# Patient Record
Sex: FEMALE | Race: WHITE | NOT HISPANIC OR LATINO | Employment: FULL TIME | URBAN - METROPOLITAN AREA
[De-identification: names, ages, dates, MRNs, and addresses within clinical notes are randomized per-mention and may not be internally consistent; named-entity substitution may affect disease eponyms.]

---

## 2020-01-21 ENCOUNTER — TRANSFERRED RECORDS (OUTPATIENT)
Dept: HEALTH INFORMATION MANAGEMENT | Facility: CLINIC | Age: 25
End: 2020-01-21

## 2020-01-21 LAB
PAP-ABSTRACT: NORMAL
TSH SERPL-ACNC: 0.94 UIU/ML (ref 0.35–4.8)

## 2020-10-19 ENCOUNTER — TRANSFERRED RECORDS (OUTPATIENT)
Dept: HEALTH INFORMATION MANAGEMENT | Facility: CLINIC | Age: 25
End: 2020-10-19

## 2020-12-30 NOTE — PROGRESS NOTES
"Subjective     Isis Sue is a 25 year old female who presents to clinic today for the following health issues:    HPI         Menstrual Concern - Prolonged bleeding during period, 1-2 heavy bleeding days  Onset/Duration:   Description:   Duration of bleeding episodes: 8-10 days  Frequency of periods: (1st day of one to 1st day of next):  every 26-29 days  Describe bleeding/flow:   Clots: YES- on heavy days  Number of pads/day: 5        Cramping: Really bad the day before the heavy   Accompanying Signs & Symptoms:  Lightheadedness: no  Temperature intolerance: no  Nosebleeds/Easy bruising: no  Vaginal Discharge: no  Acne: YES- Continual back acne - worse on certain days during her uriel  Change in body hair: no  History:  No LMP recorded. 1st day of last period was 12/22/2020  Previous normal periods: Unknown/didn't pay attention   Contraceptive use: NO  Sexually active: YES  Any bleeding after intercourse: no  Abnormal PAP Smears: no  Precipitating or alleviating factors: n/a  Therapies tried and outcome: None      Concern - Short post peak days  Onset: A year ago   Therapies tried and outcome:  none       Review of Systems   Constitutional, HEENT, cardiovascular, pulmonary, GI, , musculoskeletal, neuro, skin, endocrine and psych systems are negative, except as otherwise noted.      Objective    /82 (BP Location: Right arm, Patient Position: Sitting, Cuff Size: Adult Regular)   Pulse 78   Temp 98.9  F (37.2  C) (Tympanic)   Resp 16   Ht 1.6 m (5' 3\")   Wt 58.4 kg (128 lb 12.8 oz)   SpO2 100%   BMI 22.82 kg/m    Body mass index is 22.82 kg/m .  Physical Exam   GENERAL: healthy, alert and no distress  RESP: lungs clear to auscultation - no rales, rhonchi or wheezes  CV: tachycardia, S3 present, grade 2-3/6 diastolic murmur heard best over the LUSB and no peripheral edema  MS: no gross musculoskeletal defects noted, no edema  PSYCH: mentation appears normal, affect normal/bright    EKG - Reviewed and " interpreted by me appears normal, NSR, normal axis, normal intervals, no acute ST/T changes c/w ischemia, there are no prior tracings available, question Qt prolongation, some artifact, some flipped T waves, echo pending        Assessment & Plan     Metrorrhagia  Long bleeding pattern, few dry days post peak  Will do labs on Day 3 of menses next cycle as there is no definative peak +7 monthly  Follow-up 4-5 wks with video visit  - Multiple Vitamins-Minerals (OPTIVITE P.M.T.) TABS; Take 3 tablets by mouth 2 times daily  - US Pelvic Complete with Transvaginal; Future  - Lutropin; Future  - Follicle stimulating hormone; Future  - Testosterone Free and Total; Future  - DHEA sulfate; Future  - Androstenedione; Future  - Prolactin; Future  - TSH with free T4 reflex; Future  - Insulin level; Future  - 17 OH progesterone; Future  - Glucose; Future    Premenstrual syndrome  With strong fhx autoimmune and endometriosis as well, monitor  Start vitamins, follow-up 4-5 wks after ultrasound and labs  - Multiple Vitamins-Minerals (OPTIVITE P.M.T.) TABS; Take 3 tablets by mouth 2 times daily  - US Pelvic Complete with Transvaginal; Future  - Lutropin; Future  - Follicle stimulating hormone; Future  - Testosterone Free and Total; Future  - DHEA sulfate; Future  - Androstenedione; Future  - Prolactin; Future  - TSH with free T4 reflex; Future  - Insulin level; Future  - 17 OH progesterone; Future  - Glucose; Future    Heart murmur  New onset, not known to patient, could be sympathetic drive as she worked nights and hasn't slept yet and had to drive in fog this morning and had drank lots of caffeine    - Echocardiogram Complete; Future  - EKG 12-lead complete w/read - (Clinic Performed)    Prolonged Q-T interval on ECG  See above, may need cardiology consult pending echo results         45 min spent in chart review, reviewing records from [facility], discussion of her pathology and then work up for new heart murmur.    Patient was  agreeable to this plan and had no further questions.  There are no Patient Instructions on file for this visit.    No follow-ups on file.    Crissy Maguire MD  [facility]

## 2021-01-07 ENCOUNTER — OFFICE VISIT (OUTPATIENT)
Dept: FAMILY MEDICINE | Facility: OTHER | Age: 26
End: 2021-01-07
Attending: FAMILY MEDICINE
Payer: COMMERCIAL

## 2021-01-07 VITALS
HEART RATE: 78 BPM | HEIGHT: 63 IN | BODY MASS INDEX: 22.82 KG/M2 | OXYGEN SATURATION: 100 % | TEMPERATURE: 98.9 F | SYSTOLIC BLOOD PRESSURE: 110 MMHG | WEIGHT: 128.8 LBS | DIASTOLIC BLOOD PRESSURE: 82 MMHG | RESPIRATION RATE: 16 BRPM

## 2021-01-07 DIAGNOSIS — R01.1 HEART MURMUR: ICD-10-CM

## 2021-01-07 DIAGNOSIS — N92.1 METRORRHAGIA: Primary | ICD-10-CM

## 2021-01-07 DIAGNOSIS — R94.31 PROLONGED Q-T INTERVAL ON ECG: ICD-10-CM

## 2021-01-07 DIAGNOSIS — N94.3 PREMENSTRUAL SYNDROME: ICD-10-CM

## 2021-01-07 PROCEDURE — 93000 ELECTROCARDIOGRAM COMPLETE: CPT | Performed by: INTERNAL MEDICINE

## 2021-01-07 PROCEDURE — 99204 OFFICE O/P NEW MOD 45 MIN: CPT | Mod: 25 | Performed by: FAMILY MEDICINE

## 2021-01-07 RX ORDER — PRENATAL VIT/IRON FUM/FOLIC AC 27MG-0.8MG
1 TABLET ORAL DAILY
COMMUNITY

## 2021-01-07 RX ORDER — MULTIVITAMIN WITH MINERALS
3 TABLET ORAL 2 TIMES DAILY
Qty: 180 TABLET | Refills: 3 | Status: SHIPPED | OUTPATIENT
Start: 2021-01-07 | End: 2024-02-21

## 2021-01-07 SDOH — SOCIAL STABILITY: SOCIAL INSECURITY
WITHIN THE LAST YEAR, HAVE TO BEEN RAPED OR FORCED TO HAVE ANY KIND OF SEXUAL ACTIVITY BY YOUR PARTNER OR EX-PARTNER?: NO

## 2021-01-07 SDOH — HEALTH STABILITY: MENTAL HEALTH: HOW OFTEN DO YOU HAVE 6 OR MORE DRINKS ON ONE OCCASION?: NEVER

## 2021-01-07 SDOH — SOCIAL STABILITY: SOCIAL NETWORK: IN A TYPICAL WEEK, HOW MANY TIMES DO YOU TALK ON THE PHONE WITH FAMILY, FRIENDS, OR NEIGHBORS?: NOT ASKED

## 2021-01-07 SDOH — SOCIAL STABILITY: SOCIAL INSECURITY
WITHIN THE LAST YEAR, HAVE YOU BEEN KICKED, HIT, SLAPPED, OR OTHERWISE PHYSICALLY HURT BY YOUR PARTNER OR EX-PARTNER?: NO

## 2021-01-07 SDOH — ECONOMIC STABILITY: TRANSPORTATION INSECURITY
IN THE PAST 12 MONTHS, HAS LACK OF TRANSPORTATION KEPT YOU FROM MEETINGS, WORK, OR FROM GETTING THINGS NEEDED FOR DAILY LIVING?: NOT ASKED

## 2021-01-07 SDOH — SOCIAL STABILITY: SOCIAL INSECURITY: WITHIN THE LAST YEAR, HAVE YOU BEEN HUMILIATED OR EMOTIONALLY ABUSED IN OTHER WAYS BY YOUR PARTNER OR EX-PARTNER?: NO

## 2021-01-07 SDOH — SOCIAL STABILITY: SOCIAL NETWORK: ARE YOU MARRIED, WIDOWED, DIVORCED, SEPARATED, NEVER MARRIED, OR LIVING WITH A PARTNER?: NOT ASKED

## 2021-01-07 SDOH — SOCIAL STABILITY: SOCIAL NETWORK
DO YOU BELONG TO ANY CLUBS OR ORGANIZATIONS SUCH AS CHURCH GROUPS UNIONS, FRATERNAL OR ATHLETIC GROUPS, OR SCHOOL GROUPS?: NOT ASKED

## 2021-01-07 SDOH — HEALTH STABILITY: PHYSICAL HEALTH: ON AVERAGE, HOW MANY DAYS PER WEEK DO YOU ENGAGE IN MODERATE TO STRENUOUS EXERCISE (LIKE A BRISK WALK)?: 6 DAYS

## 2021-01-07 SDOH — HEALTH STABILITY: MENTAL HEALTH: HOW OFTEN DO YOU HAVE A DRINK CONTAINING ALCOHOL?: MONTHLY OR LESS

## 2021-01-07 SDOH — SOCIAL STABILITY: SOCIAL NETWORK: HOW OFTEN DO YOU GET TOGETHER WITH FRIENDS OR RELATIVES?: NOT ASKED

## 2021-01-07 SDOH — SOCIAL STABILITY: SOCIAL NETWORK: HOW OFTEN DO YOU ATTEND CHURCH OR RELIGIOUS SERVICES?: NOT ASKED

## 2021-01-07 SDOH — SOCIAL STABILITY: SOCIAL NETWORK: HOW OFTEN DO YOU ATTENT MEETINGS OF THE CLUB OR ORGANIZATION YOU BELONG TO?: NOT ASKED

## 2021-01-07 SDOH — HEALTH STABILITY: MENTAL HEALTH
STRESS IS WHEN SOMEONE FEELS TENSE, NERVOUS, ANXIOUS, OR CAN'T SLEEP AT NIGHT BECAUSE THEIR MIND IS TROUBLED. HOW STRESSED ARE YOU?: NOT ASKED

## 2021-01-07 SDOH — HEALTH STABILITY: PHYSICAL HEALTH: ON AVERAGE, HOW MANY MINUTES DO YOU ENGAGE IN EXERCISE AT THIS LEVEL?: 30 MIN

## 2021-01-07 SDOH — SOCIAL STABILITY: SOCIAL INSECURITY: WITHIN THE LAST YEAR, HAVE YOU BEEN AFRAID OF YOUR PARTNER OR EX-PARTNER?: NO

## 2021-01-07 SDOH — ECONOMIC STABILITY: TRANSPORTATION INSECURITY
IN THE PAST 12 MONTHS, HAS THE LACK OF TRANSPORTATION KEPT YOU FROM MEDICAL APPOINTMENTS OR FROM GETTING MEDICATIONS?: NOT ASKED

## 2021-01-07 SDOH — ECONOMIC STABILITY: FOOD INSECURITY: WITHIN THE PAST 12 MONTHS, YOU WORRIED THAT YOUR FOOD WOULD RUN OUT BEFORE YOU GOT MONEY TO BUY MORE.: NOT ASKED

## 2021-01-07 SDOH — HEALTH STABILITY: MENTAL HEALTH: HOW MANY STANDARD DRINKS CONTAINING ALCOHOL DO YOU HAVE ON A TYPICAL DAY?: 1 OR 2

## 2021-01-07 SDOH — ECONOMIC STABILITY: FOOD INSECURITY: WITHIN THE PAST 12 MONTHS, THE FOOD YOU BOUGHT JUST DIDN'T LAST AND YOU DIDN'T HAVE MONEY TO GET MORE.: NOT ASKED

## 2021-01-07 SDOH — ECONOMIC STABILITY: INCOME INSECURITY: HOW HARD IS IT FOR YOU TO PAY FOR THE VERY BASICS LIKE FOOD, HOUSING, MEDICAL CARE, AND HEATING?: NOT ASKED

## 2021-01-07 ASSESSMENT — PAIN SCALES - GENERAL: PAINLEVEL: NO PAIN (0)

## 2021-01-07 ASSESSMENT — MIFFLIN-ST. JEOR: SCORE: 1303.36

## 2021-01-15 ENCOUNTER — TRANSFERRED RECORDS (OUTPATIENT)
Dept: HEALTH INFORMATION MANAGEMENT | Facility: CLINIC | Age: 26
End: 2021-01-15

## 2021-01-18 LAB — TSH SERPL-ACNC: 2.76 MIU/ML (ref 0.35–4.8)

## 2021-02-12 ENCOUNTER — VIRTUAL VISIT (OUTPATIENT)
Dept: FAMILY MEDICINE | Facility: OTHER | Age: 26
End: 2021-02-12
Attending: FAMILY MEDICINE
Payer: COMMERCIAL

## 2021-02-12 DIAGNOSIS — E28.2 PCOS (POLYCYSTIC OVARIAN SYNDROME): ICD-10-CM

## 2021-02-12 DIAGNOSIS — N92.1 METRORRHAGIA: ICD-10-CM

## 2021-02-12 DIAGNOSIS — Q51.28 SEPTATE UTERUS: Primary | ICD-10-CM

## 2021-02-12 DIAGNOSIS — I38 DIASTOLIC MURMUR: ICD-10-CM

## 2021-02-12 PROCEDURE — 99215 OFFICE O/P EST HI 40 MIN: CPT | Mod: 95 | Performed by: FAMILY MEDICINE

## 2021-02-12 NOTE — NURSING NOTE
"Chief Complaint   Patient presents with     Recheck Medication       Initial There were no vitals taken for this visit. Estimated body mass index is 22.82 kg/m  as calculated from the following:    Height as of 1/7/21: 1.6 m (5' 3\").    Weight as of 1/7/21: 58.4 kg (128 lb 12.8 oz).  Medication Reconciliation: complete  Diogenes Mena LPN  "

## 2021-02-12 NOTE — PATIENT INSTRUCTIONS
1.  Contact [facility] echocardiogram department  2.  Will get cardiology consult  3.  Will re-fax orders for those other 4 labs -- do labs fasting on day 3 in the morning   4.  Keep on optivite 1 tab 2x/day  5.  Start smarty pants for women (pink lid) -- 3 gummies 2x/day  6.  septated uterus? -- ob/gyn referral Dr Walters  (need kidney ultrasound)  7.  After labs are done, start inositol or kary-inositol 1 'dose' 2x/day

## 2021-02-12 NOTE — PROGRESS NOTES
Isis is a 25 year old who is being evaluated via a billable video visit.      How would you like to obtain your AVS? MyChart  If the video visit is dropped, the invitation should be resent by: Text to cell phone: [phone]  Will anyone else be joining your video visit? No      Video Start Time: 1345    Assessment & Plan     Septate uterus    - OB/GYN REFERRAL    Diastolic murmur  Still needs echo  - CARDIOLOGY EVAL ADULT REFERRAL    PCOS (polycystic ovarian syndrome)  Reverse ratio but not all labs back, orders re-faxed and after she does labs on day 3, she can start inositol    Metrorrhagia  Improving slightly with vitamins      Review of prior external note(s) from - Outside records from [facility]  45 minutes spent on the date of the encounter doing chart review, history and exam, documentation and further activities as noted above       Patient was agreeable to this plan and had no further questions.  Patient Instructions   1.  Contact [facility] echocardiogram department  2.  Will get cardiology consult  3.  Will re-fax orders for those other 4 labs -- do labs fasting on day 3 in the morning   4.  Keep on optivite 1 tab 2x/day  5.  Start smarty pants for women (pink lid) -- 3 gummies 2x/day  6.  septated uterus? -- ob/gyn referral Dr Walters  (need kidney ultrasound)  7.  After labs are done, start inositol or kary-inositol 1 'dose' 2x/day      No follow-ups on file.    Crissy Maguire MD  [facility] - [place]    Subjective   Isis is a 25 year old who presents for the following health issues     HPI     Labs and ultrasound completed at [facility]    Follow up ultrasound/labs  Onset: 01/15/2021-ultrasound  Description: transvaginal ultrasound  Intensity: n/a  Progression of Symptoms:  n/a  Accompanying Signs & Symptoms: n/a  Previous history of similar problem: none    Review of Systems   Constitutional, HEENT, cardiovascular, pulmonary, GI, , musculoskeletal, neuro, skin, endocrine and psych  systems are negative, except as otherwise noted.      Objective           Vitals:  No vitals were obtained today due to virtual visit.    Physical Exam   GENERAL: Healthy, alert and no distress  EYES: Eyes grossly normal to inspection.  No discharge or erythema, or obvious scleral/conjunctival abnormalities.  RESP: No audible wheeze, cough, or visible cyanosis.  No visible retractions or increased work of breathing.    SKIN: Visible skin clear. No significant rash, abnormal pigmentation or lesions.  NEURO: Cranial nerves grossly intact.  Mentation and speech appropriate for age.  PSYCH: Mentation appears normal, affect normal/bright, judgement and insight intact, normal speech and appearance well-groomed.    No results found for any visits on 02/12/21.    I spent a total of 45 minutes face-to-face with Isis Hurd during today's office visit.  Over 50% of this time was spent counseling the patient and/or coordinating care regarding PCOS, diastolic murmur and septated uterus.  See note for details.        Video-Visit Details    Type of service:  Video Visit    Video End Time:1435    Originating Location (pt. Location): Home    Distant Location (provider location):  [facility]     Platform used for Video Visit: [employer]

## 2021-02-19 ENCOUNTER — TRANSFERRED RECORDS (OUTPATIENT)
Dept: HEALTH INFORMATION MANAGEMENT | Facility: CLINIC | Age: 26
End: 2021-02-19

## 2021-02-19 ENCOUNTER — TELEPHONE (OUTPATIENT)
Dept: FAMILY MEDICINE | Facility: OTHER | Age: 26
End: 2021-02-19

## 2021-02-19 LAB — GLUCOSE SERPL-MCNC: 91 MG/DL (ref 60–99)

## 2021-02-19 NOTE — TELEPHONE ENCOUNTER
Odalys from [facility] lab called wondering if she could draw labs for this patient.  Advised that the labs have been ordered on 1/7 and are all future lab orders.

## 2021-03-06 ENCOUNTER — HEALTH MAINTENANCE LETTER (OUTPATIENT)
Age: 26
End: 2021-03-06

## 2021-03-10 ENCOUNTER — TRANSFERRED RECORDS (OUTPATIENT)
Dept: HEALTH INFORMATION MANAGEMENT | Facility: CLINIC | Age: 26
End: 2021-03-10

## 2021-03-10 LAB — EJECTION FRACTION: =>55 %

## 2021-03-12 ENCOUNTER — TRANSFERRED RECORDS (OUTPATIENT)
Dept: HEALTH INFORMATION MANAGEMENT | Facility: CLINIC | Age: 26
End: 2021-03-12

## 2021-03-17 ENCOUNTER — TRANSFERRED RECORDS (OUTPATIENT)
Dept: HEALTH INFORMATION MANAGEMENT | Facility: CLINIC | Age: 26
End: 2021-03-17

## 2021-03-19 NOTE — PROGRESS NOTES
Assessment & Plan     Septate uterus  She is going to be having surgery in the next month or so  Awaiting records from her consult and MRI    Heart murmur  Per cardiologist she has a 'turbulent flow murmur'  Echocardiogram was normal    Premenstrual syndrome  Continue vitamins and I need to look at labs further  CAH?  PCOS?      Review of prior external note(s) from - Outside records from [facility]  20 minutes spent on the date of the encounter doing chart review, history and exam, documentation and further activities as noted above       Patient was agreeable to this plan and had no further questions.  There are no Patient Instructions on file for this visit.    No follow-ups on file.    Crissy Maguire MD  [facility] Marinelli is a 26 year old who presents for the following health issues   HPI     Clinic Visit  Date of visit: 3/19/2021   Chief Complaint:   Chief Complaint   Patient presents with     RECHECK       HPI:   Isis Hurd is a 26 year old  female who presents to clinic today for follow up  for PCOS .     Menarche: age 14 -15  Menses: frequency: every 26-32 days and length: 8-10 days. Patient's last menstrual period was 2021 (exact date).   Family Planning Chart: Yes: .  Acne: No.  Hirsutism (facial hair): No.  Male-pattern hair loss: No.  Elevated serum androgen: No results found for: DHEA, TESTOSTTOTAL, FT, PATRICIA]  BMI: There is no height or weight on file to calculate BMI.   Type 2 DM: No.  Elevated Cholesterol: No.  Mood disorder: No.    Ultrasound: 2021     Gynecologic History:  Last Pap: No results found for: PAP   History of abnormal pap: No.    Obstetric History:   OB History    Para Term  AB Living   0 0 0 0 0 0   SAB TAB Ectopic Multiple Live Births   0 0 0 0 0     Obstetric history significant for:  n/a    Medications:  Multiple Vitamins-Minerals (OPTIVITE P.M.T.) TABS, Take 3 tablets by mouth 2 times daily  Prenatal Vit-Fe  Fumarate-FA (PRENATAL MULTIVITAMIN W/IRON) 27-0.8 MG tablet, Take 1 tablet by mouth daily  Ascorbic Acid (VITAMIN C) 500 MG CHEW,   Vitamin D3 (CHOLECALCIFEROL) 125 MCG (5000 UT) tablet, Take 1 tablet by mouth daily    No current facility-administered medications on file prior to visit.       Allergy:  Allergies   Allergen Reactions     Adhesive Tape Itching     Steri strips      Food      Walnuts and pineapple sores in mouth and throat swelling     Iodine Rash     Betadine       Medical History:  Past Medical History:   Diagnosis Date     Concussion without loss of consciousness     fell out of cart at [employer] at age 3-4     MICHAEL (generalized anxiety disorder)        Surgical History:  Past Surgical History:   Procedure Laterality Date     ORTHOPEDIC SURGERY      has had multiple hip surg for labral tear, elbow dislocation/fx and foot --extra bone       Social History:  Social History    Substance and Sexual Activity      Alcohol use: Yes        Frequency: Monthly or less        Drinks per session: 1 or 2        Binge frequency: Never    History   Smoking Status     Never Smoker   Smokeless Tobacco     Never Used     History   Drug Use Unknown     History   Sexual Activity     Sexual activity: Not on file     Relationship status is:  / Partnered    6 months  Years.  Name of Spouse / Partner:  Clarence.    Lives in Stable housing and no concerns with Spouse.   Employment: Employed full time  History of sexual, physical or mental abuse: No.   She denies current fears or concerns for personal safety.      Review of Systems:    Review of Systems   Constitutional, HEENT, cardiovascular, pulmonary, gi and gu systems are negative, except as otherwise noted.      Objective    LMP 03/21/2021 (Exact Date)   There is no height or weight on file to calculate BMI.  Physical Exam   GENERAL: healthy, alert and no distress  EYES: Eyes grossly normal to inspection, PERRL and conjunctivae and sclerae normal  NECK: no adenopathy, no  asymmetry, masses, or scars and thyroid normal to palpation  ABDOMEN: soft, nontender, no hepatosplenomegaly, no masses and bowel sounds normal  MS: no gross musculoskeletal defects noted, no edema  PSYCH: mentation appears normal, affect normal/bright    No results found for any visits on 03/23/21.

## 2021-03-23 ENCOUNTER — VIRTUAL VISIT (OUTPATIENT)
Dept: FAMILY MEDICINE | Facility: OTHER | Age: 26
End: 2021-03-23
Attending: FAMILY MEDICINE
Payer: COMMERCIAL

## 2021-03-23 DIAGNOSIS — R01.1 HEART MURMUR: ICD-10-CM

## 2021-03-23 DIAGNOSIS — N94.3 PREMENSTRUAL SYNDROME: ICD-10-CM

## 2021-03-23 DIAGNOSIS — Q51.28 SEPTATE UTERUS: Primary | ICD-10-CM

## 2021-03-23 PROCEDURE — 99213 OFFICE O/P EST LOW 20 MIN: CPT | Mod: 95 | Performed by: FAMILY MEDICINE

## 2021-03-23 NOTE — NURSING NOTE
"Chief Complaint   Patient presents with     RECHECK       Initial LMP 03/21/2021 (Exact Date)  Estimated body mass index is 22.82 kg/m  as calculated from the following:    Height as of 1/7/21: 1.6 m (5' 3\").    Weight as of 1/7/21: 58.4 kg (128 lb 12.8 oz).  Medication Reconciliation: dustin Millan  "

## 2021-04-05 ENCOUNTER — TELEPHONE (OUTPATIENT)
Dept: FAMILY MEDICINE | Facility: OTHER | Age: 26
End: 2021-04-05

## 2021-04-05 NOTE — TELEPHONE ENCOUNTER
Can we please request lab results for pt from [facility]? Lab orders that were placed 1/7 were completed there.

## 2021-04-13 NOTE — PROGRESS NOTES
Isis is a 26 year old who is being evaluated via a billable video visit.      How would you like to obtain your AVS? MyChart  If the video visit is dropped, the invitation should be resent by: Text to cell phone: [phone]  Will anyone else be joining your video visit? No      Video Start Time: 0958    Assessment & Plan     Elevated prolactin level  She does work nights, has stressors in that way  Now switching to evening shift permanently  She is coming here for preop in 1 month, will recheck then, early morning fasting level  If still borderline elevated, will obtain MR pituitary at [facility]    - MR Pituitary w and w/o contrast; Future    PCOS (polycystic ovarian syndrome)  Most recent cycle was only 26-28 days, has felt more 'ragey' lately  Suspect with vitamins and inositol she is ovulating better, continue regimen and recheck fsh, lh, insulin next month    Septate uterus  Having surgery 6/3/21    Premenstrual syndrome  More anxious/'ragey' lately -- I suspect she has luteal phase defect now that she is ovulating better, will need to adjust progesterone and estradiol when we get those labs  Discussed sometimes things get worse before better  May consider LDN if not improving      Review of prior external note(s) from - Outside records from [facility]      Patient was agreeable to this plan and had no further questions.  There are no Patient Instructions on file for this visit.    No follow-ups on file.    Crissy Maguire MD  [facility] [place]    Subjective   Isis is a 26 year old who presents for the following health issues     HPI     Concern - Follow up labs // septate uterus  Onset: 3/23/2021  Description: Pt is pre-planning for pregnancy - Lab monitoring   Intensity: n/a   Progression of Symptoms:  same  Therapies tried and outcome: Prenatal and Inisotal     Concern - PCOS  Onset: years  Description: short cycle this month, typically longer  Intensity: moderate  Progression of Symptoms:   worsening  Accompanying Signs & Symptoms: more mood swings  Previous history of similar problem: unsure  Precipitating factors:        Worsened by: changing shifts  Alleviating factors:        Improved by: vitamins  Therapies tried and outcome: inositol      Review of Systems   Constitutional, HEENT, cardiovascular, pulmonary, gi and gu systems are negative, except as otherwise noted.      Objective           Vitals:  No vitals were obtained today due to virtual visit.    Physical Exam   GENERAL: Healthy, alert and no distress  EYES: Eyes grossly normal to inspection.  No discharge or erythema, or obvious scleral/conjunctival abnormalities.  RESP: No audible wheeze, cough, or visible cyanosis.  No visible retractions or increased work of breathing.    SKIN: Visible skin clear. No significant rash, abnormal pigmentation or lesions.  NEURO: Cranial nerves grossly intact.  Mentation and speech appropriate for age.  PSYCH: Mentation appears normal, affect normal/bright, judgement and insight intact, normal speech and appearance well-groomed.    Transferred Records on 03/10/2021   Component Date Value Ref Range Status     Ejection Fraction 03/10/2021 =>55  % Final             Video-Visit Details    Type of service:  Video Visit    Video End Time:1016    Originating Location (pt. Location): Home    Distant Location (provider location):  [facility]     Platform used for Video Visit: [employer]

## 2021-04-16 ENCOUNTER — VIRTUAL VISIT (OUTPATIENT)
Dept: FAMILY MEDICINE | Facility: OTHER | Age: 26
End: 2021-04-16
Attending: FAMILY MEDICINE
Payer: COMMERCIAL

## 2021-04-16 ENCOUNTER — TRANSFERRED RECORDS (OUTPATIENT)
Dept: HEALTH INFORMATION MANAGEMENT | Facility: CLINIC | Age: 26
End: 2021-04-16

## 2021-04-16 DIAGNOSIS — R79.89 ELEVATED PROLACTIN LEVEL: Primary | ICD-10-CM

## 2021-04-16 DIAGNOSIS — E28.2 PCOS (POLYCYSTIC OVARIAN SYNDROME): ICD-10-CM

## 2021-04-16 DIAGNOSIS — N94.3 PREMENSTRUAL SYNDROME: ICD-10-CM

## 2021-04-16 DIAGNOSIS — Q51.28 SEPTATE UTERUS: ICD-10-CM

## 2021-04-16 PROCEDURE — 99214 OFFICE O/P EST MOD 30 MIN: CPT | Mod: GT | Performed by: FAMILY MEDICINE

## 2021-04-16 NOTE — NURSING NOTE
"Chief Complaint   Patient presents with     Lab Result Notice     Clinic Care Coordination - Follow-up       Initial LMP 03/21/2021 (Exact Date)  Estimated body mass index is 22.82 kg/m  as calculated from the following:    Height as of 1/7/21: 1.6 m (5' 3\").    Weight as of 1/7/21: 58.4 kg (128 lb 12.8 oz).  Medication Reconciliation: dustin Millan  "

## 2021-05-04 ENCOUNTER — TELEPHONE (OUTPATIENT)
Dept: FAMILY MEDICINE | Facility: OTHER | Age: 26
End: 2021-05-04

## 2021-05-04 NOTE — TELEPHONE ENCOUNTER
Medhat from [facility] at [facility] calling and they had drawn labs for .Pt states they are not the right labs that where done.She doesn't want to pay for them.    Please call Medhat at [phone]      Tricia Jones RN

## 2021-05-05 NOTE — TELEPHONE ENCOUNTER
They fer 'insulin antibody' and it should have been just 'insulin'   The rest of the labs are fine -- we are rechecking some when she comes here but everything else should be here

## 2021-05-05 NOTE — TELEPHONE ENCOUNTER
Medhat returned call to clinic. She states  called them stating that we wanted specific labs and they fer what orders they had in lab. Glucose, Progesterone, insulin level and androstenedione  On 2-16-21.  filed a complaint they should not have to pay for the labs because they have to redraw them again. She is looking for any help. I cannot see anything in media besides lab results. Nothing printed on anything. Not sure if patient went in at wrong time etc? Please advise. They are looking for any help or information you have.

## 2021-05-10 NOTE — PROGRESS NOTES
[facility] - [place]  [address]  [place] [address]  Phone: [phone]  Primary Provider: System, Provider Not In  Pre-op Performing Provider: [facility]ISHAL [facility]SE      PREOPERATIVE EVALUATION:  Today's date: 5/21/2021    Isis Hurd is a 26 year old female who presents for a preoperative evaluation.    Surgical Information:  Surgery/Procedure: Resection of uterine septum   Surgery Location: [facility]  Surgeon: Dr. Tran   Surgery Date: 6-3-21  Time of Surgery: unknown   Where patient plans to recover: At home with family  Fax number for surgical facility: unknown    Type of Anesthesia Anticipated: to be determined    Assessment & Plan     The proposed surgical procedure is considered INTERMEDIATE risk.    Preop general physical exam  Cleared for surgery  - CBC with platelets; Future  - Comprehensive metabolic panel; Future    PCOS (polycystic ovarian syndrome)  Follow-up 2-3 mos as planning to start trying in october       Risks and Recommendations:  The patient has the following additional risks and recommendations for perioperative complications:   - No identified additional risk factors other than previously addressed    Medication Instructions:  Patient is to take all scheduled medications on the day of surgery    RECOMMENDATION:  APPROVAL GIVEN to proceed with proposed procedure, without further diagnostic evaluation.              Subjective     HPI related to upcoming procedure: uterine septum that needs to be resolved in order to have healthy pregnancy      Preop Questions 5/21/2021   1. Have you ever had a heart attack or stroke? No   2. Have you ever had surgery on your heart or blood vessels, such as a stent placement, a coronary artery bypass, or surgery on an artery in your head, neck, heart, or legs? No   3. Do you have chest pain with activity? No   4. Do you have a history of  heart failure? No   5. Do you currently have a cold, bronchitis or symptoms of other infection? No    6. Do you have a cough, shortness of breath, or wheezing? No   7. Do you or anyone in your family have previous history of blood clots? No   8. Do you or does anyone in your family have a serious bleeding problem such as prolonged bleeding following surgeries or cuts? No   9. Have you ever had problems with anemia or been told to take iron pills? No   10. Have you had any abnormal blood loss such as black, tarry or bloody stools, or abnormal vaginal bleeding? No   11. Have you ever had a blood transfusion? No   12. Are you willing to have a blood transfusion if it is medically needed before, during, or after your surgery? Yes   13. Have you or any of your relatives ever had problems with anesthesia? YES - self and mother- trouble waking up    14. Do you have sleep apnea, excessive snoring or daytime drowsiness? No   15. Do you have any artifical heart valves or other implanted medical devices like a pacemaker, defibrillator, or continuous glucose monitor? No   16. Do you have artificial joints? No   17. Are you allergic to latex? No   18. Is there any chance that you may be pregnant? No     Health Care Directive:  Patient does not have a Health Care Directive or Living Will:     Preoperative Review of :   reviewed - no record of controlled substances prescribed.      Status of Chronic Conditions:  See problem list for active medical problems.  Problems all longstanding and stable, except as noted/documented.  See ROS for pertinent symptoms related to these conditions.      Review of Systems  CONSTITUTIONAL: NEGATIVE for fever, chills, change in weight  INTEGUMENTARY/SKIN: NEGATIVE for worrisome rashes, moles or lesions  EYES: NEGATIVE for vision changes or irritation  ENT/MOUTH: NEGATIVE for ear, mouth and throat problems  RESP: NEGATIVE for significant cough or SOB  BREAST: NEGATIVE for masses, tenderness or discharge  CV: NEGATIVE for chest pain, palpitations or peripheral edema  GI: NEGATIVE for nausea,  abdominal pain, heartburn, or change in bowel habits  : NEGATIVE for frequency, dysuria, or hematuria  MUSCULOSKELETAL: NEGATIVE for significant arthralgias or myalgia  NEURO: NEGATIVE for weakness, dizziness or paresthesias  ENDOCRINE: NEGATIVE for temperature intolerance, skin/hair changes  HEME: NEGATIVE for bleeding problems  PSYCHIATRIC: NEGATIVE for changes in mood or affect    Patient Active Problem List    Diagnosis Date Noted     PCOS (polycystic ovarian syndrome) 02/12/2021     Priority: Medium     Septate uterus 02/12/2021     Priority: Medium     Metrorrhagia 01/07/2021     Priority: Medium     Premenstrual syndrome 01/07/2021     Priority: Medium     Heart murmur 01/07/2021     Priority: Medium     Prolonged Q-T interval on ECG 01/07/2021     Priority: Medium      Past Medical History:   Diagnosis Date     Concussion without loss of consciousness     fell out of cart at [employer] at age 3-4     MICHAEL (generalized anxiety disorder)      Past Surgical History:   Procedure Laterality Date     ORTHOPEDIC SURGERY      has had multiple hip surg for labral tear, elbow dislocation/fx and foot --extra bone     Current Outpatient Medications   Medication Sig Dispense Refill     Inositol 324 MG TABS Take 2,000 mg by mouth       Multiple Vitamins-Minerals (OPTIVITE P.M.T.) TABS Take 3 tablets by mouth 2 times daily 180 tablet 3     Prenatal Vit-Fe Fumarate-FA (PRENATAL MULTIVITAMIN W/IRON) 27-0.8 MG tablet Take 1 tablet by mouth daily       Ascorbic Acid (VITAMIN C) 500 MG CHEW        Vitamin D3 (CHOLECALCIFEROL) 125 MCG (5000 UT) tablet Take 1 tablet by mouth daily         Allergies   Allergen Reactions     Adhesive Tape Itching     Steri strips      Food      Walnuts and pineapple sores in mouth and throat swelling     Iodine Rash     Betadine        Social History     Tobacco Use     Smoking status: Never Smoker     Smokeless tobacco: Never Used   Substance Use Topics     Alcohol use: Yes     Frequency: Monthly or  less     Drinks per session: 1 or 2     Binge frequency: Never     Family History   Problem Relation Age of Onset     Endometriosis Mother      Sjogren's Mother      Depression Father      Family History Negative Brother      Sjogren's Maternal Grandmother      Alcoholism Maternal Grandfather      Cirrhosis Maternal Grandfather      Influenza/Pneumonia Paternal Grandmother 93     Pancreatitis Paternal Grandfather      History   Drug Use Unknown         Objective     /80 (BP Location: Right arm, Patient Position: Chair, Cuff Size: Adult Regular)   Pulse 74   Temp 98.3  F (36.8  C) (Tympanic)   Resp 16   Wt 60.8 kg (134 lb)   LMP 05/10/2021   SpO2 100%   BMI 23.74 kg/m      Physical Exam    GENERAL APPEARANCE: healthy, alert and no distress     EYES: EOMI, PERRL     HENT: ear canals and TM's normal and nose and mouth without ulcers or lesions     NECK: no adenopathy, no asymmetry, masses, or scars and thyroid normal to palpation     RESP: lungs clear to auscultation - no rales, rhonchi or wheezes     CV: regular rates and rhythm, normal S1 S2, no S3 or S4 and no murmur, click or rub     ABDOMEN:  soft, nontender, no HSM or masses and bowel sounds normal     MS: extremities normal- no gross deformities noted, no evidence of inflammation in joints, FROM in all extremities.     SKIN: no suspicious lesions or rashes     NEURO: Normal strength and tone, sensory exam grossly normal, mentation intact and speech normal     PSYCH: mentation appears normal. and affect normal/bright     LYMPHATICS: No cervical adenopathy    No results for input(s): HGB, PLT, INR, NA, POTASSIUM, CR, A1C in the last 04764 hours.     Diagnostics:  Recent Results (from the past 24 hour(s))   Comprehensive metabolic panel    Collection Time: 05/21/21  9:53 AM   Result Value Ref Range    Sodium 138 133 - 144 mmol/L    Potassium 3.8 3.4 - 5.3 mmol/L    Chloride 104 94 - 109 mmol/L    Carbon Dioxide 25 20 - 32 mmol/L    Anion Gap 9 3 - 14  mmol/L    Glucose 83 70 - 99 mg/dL    Urea Nitrogen 11 7 - 30 mg/dL    Creatinine 0.69 0.52 - 1.04 mg/dL    GFR Estimate >90 >60 mL/min/[1.73_m2]    GFR Estimate If Black >90 >60 mL/min/[1.73_m2]    Calcium 8.9 8.5 - 10.1 mg/dL    Bilirubin Total 0.7 0.2 - 1.3 mg/dL    Albumin 4.0 3.4 - 5.0 g/dL    Protein Total 8.0 6.8 - 8.8 g/dL    Alkaline Phosphatase 49 40 - 150 U/L    ALT 25 0 - 50 U/L    AST 21 0 - 45 U/L   CBC with platelets    Collection Time: 05/21/21  9:53 AM   Result Value Ref Range    WBC 7.1 4.0 - 11.0 10e9/L    RBC Count 4.34 3.8 - 5.2 10e12/L    Hemoglobin 13.8 11.7 - 15.7 g/dL    Hematocrit 39.8 35.0 - 47.0 %    MCV 92 78 - 100 fl    MCH 31.8 26.5 - 33.0 pg    MCHC 34.7 31.5 - 36.5 g/dL    RDW 13.0 10.0 - 15.0 %    Platelet Count 197 150 - 450 10e9/L      No EKG required for low risk surgery (cataract, skin procedure, breast biopsy, etc).    Revised Cardiac Risk Index (RCRI):  The patient has the following serious cardiovascular risks for perioperative complications:   - No serious cardiac risks = 0 points     RCRI Interpretation: 0 points: Class I (very low risk - 0.4% complication rate)           Signed Electronically by: Crissy Maguire MD  Copy of this evaluation report is provided to requesting physician.

## 2021-05-21 ENCOUNTER — OFFICE VISIT (OUTPATIENT)
Dept: FAMILY MEDICINE | Facility: OTHER | Age: 26
End: 2021-05-21
Attending: FAMILY MEDICINE
Payer: COMMERCIAL

## 2021-05-21 ENCOUNTER — TELEPHONE (OUTPATIENT)
Dept: FAMILY MEDICINE | Facility: OTHER | Age: 26
End: 2021-05-21

## 2021-05-21 VITALS
WEIGHT: 134 LBS | SYSTOLIC BLOOD PRESSURE: 126 MMHG | HEART RATE: 74 BPM | TEMPERATURE: 98.3 F | DIASTOLIC BLOOD PRESSURE: 80 MMHG | OXYGEN SATURATION: 100 % | RESPIRATION RATE: 16 BRPM | BODY MASS INDEX: 23.74 KG/M2

## 2021-05-21 DIAGNOSIS — Z01.818 PREOP GENERAL PHYSICAL EXAM: ICD-10-CM

## 2021-05-21 DIAGNOSIS — E28.2 PCOS (POLYCYSTIC OVARIAN SYNDROME): ICD-10-CM

## 2021-05-21 DIAGNOSIS — Z01.818 PREOP GENERAL PHYSICAL EXAM: Primary | ICD-10-CM

## 2021-05-21 LAB
ALBUMIN SERPL-MCNC: 4 G/DL (ref 3.4–5)
ALP SERPL-CCNC: 49 U/L (ref 40–150)
ALT SERPL W P-5'-P-CCNC: 25 U/L (ref 0–50)
ANION GAP SERPL CALCULATED.3IONS-SCNC: 9 MMOL/L (ref 3–14)
AST SERPL W P-5'-P-CCNC: 21 U/L (ref 0–45)
BILIRUB SERPL-MCNC: 0.7 MG/DL (ref 0.2–1.3)
BUN SERPL-MCNC: 11 MG/DL (ref 7–30)
CALCIUM SERPL-MCNC: 8.9 MG/DL (ref 8.5–10.1)
CHLORIDE SERPL-SCNC: 104 MMOL/L (ref 94–109)
CO2 SERPL-SCNC: 25 MMOL/L (ref 20–32)
CREAT SERPL-MCNC: 0.69 MG/DL (ref 0.52–1.04)
ERYTHROCYTE [DISTWIDTH] IN BLOOD BY AUTOMATED COUNT: 13 % (ref 10–15)
GFR SERPL CREATININE-BSD FRML MDRD: >90 ML/MIN/{1.73_M2}
GLUCOSE SERPL-MCNC: 83 MG/DL (ref 70–99)
HCT VFR BLD AUTO: 39.8 % (ref 35–47)
HGB BLD-MCNC: 13.8 G/DL (ref 11.7–15.7)
MCH RBC QN AUTO: 31.8 PG (ref 26.5–33)
MCHC RBC AUTO-ENTMCNC: 34.7 G/DL (ref 31.5–36.5)
MCV RBC AUTO: 92 FL (ref 78–100)
PLATELET # BLD AUTO: 197 10E9/L (ref 150–450)
POTASSIUM SERPL-SCNC: 3.8 MMOL/L (ref 3.4–5.3)
PROT SERPL-MCNC: 8 G/DL (ref 6.8–8.8)
RBC # BLD AUTO: 4.34 10E12/L (ref 3.8–5.2)
SODIUM SERPL-SCNC: 138 MMOL/L (ref 133–144)
WBC # BLD AUTO: 7.1 10E9/L (ref 4–11)

## 2021-05-21 PROCEDURE — 36415 COLL VENOUS BLD VENIPUNCTURE: CPT | Performed by: FAMILY MEDICINE

## 2021-05-21 PROCEDURE — 99214 OFFICE O/P EST MOD 30 MIN: CPT | Performed by: FAMILY MEDICINE

## 2021-05-21 PROCEDURE — 85027 COMPLETE CBC AUTOMATED: CPT | Performed by: FAMILY MEDICINE

## 2021-05-21 PROCEDURE — 80053 COMPREHEN METABOLIC PANEL: CPT | Performed by: FAMILY MEDICINE

## 2021-05-21 ASSESSMENT — PAIN SCALES - GENERAL: PAINLEVEL: NO PAIN (0)

## 2021-05-21 NOTE — NURSING NOTE
"Chief Complaint   Patient presents with     Pre-Op Exam       Initial /80 (BP Location: Right arm, Patient Position: Chair, Cuff Size: Adult Regular)   Pulse 74   Temp 98.3  F (36.8  C) (Tympanic)   Resp 16   Wt 60.8 kg (134 lb)   LMP 05/10/2021   SpO2 100%   BMI 23.74 kg/m   Estimated body mass index is 23.74 kg/m  as calculated from the following:    Height as of 1/7/21: 1.6 m (5' 3\").    Weight as of this encounter: 60.8 kg (134 lb).  Medication Reconciliation: complete  Abimbola Garcia LPN    "

## 2021-06-03 ENCOUNTER — TRANSFERRED RECORDS (OUTPATIENT)
Dept: HEALTH INFORMATION MANAGEMENT | Facility: CLINIC | Age: 26
End: 2021-06-03

## 2021-06-16 ENCOUNTER — TRANSFERRED RECORDS (OUTPATIENT)
Dept: HEALTH INFORMATION MANAGEMENT | Facility: CLINIC | Age: 26
End: 2021-06-16

## 2021-06-25 ENCOUNTER — TRANSFERRED RECORDS (OUTPATIENT)
Dept: HEALTH INFORMATION MANAGEMENT | Facility: CLINIC | Age: 26
End: 2021-06-25

## 2021-08-10 NOTE — PROGRESS NOTES
Clinic Visit  Date of visit: 8/10/2021   Chief Complaint:   Chief Complaint   Patient presents with     PCOS       HPI:   Isis Hurd is a 26 year old  female who presents to clinic today for follow up  for PCOS .     Menarche: age 14  Menses: frequency: every 26-30 days and length: 7 days. Patient's last menstrual period was 2021.   Family Planning Chart: Yes: .  Acne: Yes: .  Hirsutism (facial hair): Yes: .  Male-pattern hair loss: No.  Elevated serum androgen: No results found for: DHEA, TESTOSTTOTAL, FT, PATRICIA]  BMI: Body mass index is 23.03 kg/m .   Type 2 DM: No.  Elevated Cholesterol: No.  Mood disorder: Yes: anxiety .    Ultrasound: end       Gynecologic History:  Last Pap: No results found for: PAP   History of abnormal pap: No.    Obstetric History:   OB History    Para Term  AB Living   0 0 0 0 0 0   SAB TAB Ectopic Multiple Live Births   0 0 0 0 0     Obstetric history significant for:  n/a    Medications:  Ascorbic Acid (VITAMIN C) 500 MG CHEW,   Inositol 324 MG TABS, Take 2,000 mg by mouth  Multiple Vitamins-Minerals (OPTIVITE P.M.T.) TABS, Take 3 tablets by mouth 2 times daily  Prenatal Vit-Fe Fumarate-FA (PRENATAL MULTIVITAMIN W/IRON) 27-0.8 MG tablet, Take 1 tablet by mouth daily  Vitamin D3 (CHOLECALCIFEROL) 125 MCG (5000 UT) tablet, Take 1 tablet by mouth daily    No current facility-administered medications on file prior to visit.      Allergy:  Allergies   Allergen Reactions     Adhesive Tape Itching     Steri strips      Food      Walnuts and pineapple sores in mouth and throat swelling     Iodine Rash     Betadine       Medical History:  Past Medical History:   Diagnosis Date     Concussion without loss of consciousness     fell out of cart at [employer] at age 3-4     MICHAEL (generalized anxiety disorder)      Menorrhagia with regular cycle      PCOS (polycystic ovarian syndrome)      Septate uterus        Surgical History:  Past Surgical History:   Procedure  Laterality Date     GYN SURGERY  06/03/2021    Resection of uterine septum     ORTHOPEDIC SURGERY      has had multiple hip surg for labral tear, elbow dislocation/fx and foot --extra bone       Social History:  Social History    Substance and Sexual Activity      Alcohol use: Yes    History   Smoking Status     Never Smoker   Smokeless Tobacco     Never Used     History   Drug Use Unknown     History   Sexual Activity     Sexual activity: Not on file     Relationship status is:  / Partnered    1 Years.  Name of Spouse / Partner:  Ernesto.    Lives in Stable housing and no concerns with Spouse and Other:  And in laws right now .   Employment: Employed full time  History of sexual, physical or mental abuse: No.   She denies current fears or concerns for personal safety.      Review of Systems:    GENERAL: No change in weight, sleep or appetite.  Normal energy.  No fever or chills  EYES: Negative for vision changes or eye problems  ENT: No problems with ears, nose or throat.  No difficulty swallowing.  RESP: No coughing, wheezing or shortness of breath  CV: No chest pains or palpitations  GI: No nausea, vomiting,  heartburn, abdominal pain, diarrhea, constipation or change in bowel habits  : No urinary frequency or dysuria, bladder or kidney problems  MUSCULOSKELETAL: No significant muscle or joint pains  NEUROLOGIC: No headaches, numbness, tingling, weakness, problems with balance or coordination  PSYCHIATRIC: No problems with anxiety, depression or mental health  HEME/IMMUNE/ALLERGY: No history of bleeding or clotting problems or anemia.  No allergies or immune system problems  ENDOCRINE: No history of thyroid disease, diabetes or other endocrine disorders  SKIN: No rashes,worrisome lesions or skin problems      Physical Exam:  Vitals:    08/19/21 0936   BP: 121/80   Pulse: 75   Temp: 98.6  F (37  C)   SpO2: 98%   Weight: 59 kg (130 lb)     Body mass index is 23.03 kg/m .    Gen: NAD, Awake and alert,  cooperative with exam  Resp:  No trouble breathing  Extremities: nontender, no edema  Psych:  Normal mood and mentation    Assessment/Plan:  Isis Hurd is a 26 year old  who presents for follow up  for PCOS .   (E55.9) Hypovitaminosis D  (primary encounter diagnosis)  Comment: was low previously  Plan: Vitamin D Deficiency          (E28.2) PCOS (polycystic ovarian syndrome)  Comment:   Plan: DHEA sulfate, Androstenedione, Insulin level,         Follicle stimulating hormone, Lutropin        Recheck labs -- moved to [place] now  Fasting labs on peak +7 or day 3 or 4 fasting before 8am  Follow-up 2 wks video visit    (E19.22) Elevated prolactin level  Comment:   Plan: Prolactin        Check on this and treat if needed before doing targeted hormone profile      Crissy Maguire MD

## 2021-08-19 ENCOUNTER — VIRTUAL VISIT (OUTPATIENT)
Dept: FAMILY MEDICINE | Facility: OTHER | Age: 26
End: 2021-08-19
Attending: FAMILY MEDICINE
Payer: COMMERCIAL

## 2021-08-19 VITALS
WEIGHT: 130 LBS | TEMPERATURE: 98.6 F | DIASTOLIC BLOOD PRESSURE: 80 MMHG | BODY MASS INDEX: 23.03 KG/M2 | OXYGEN SATURATION: 98 % | HEART RATE: 75 BPM | SYSTOLIC BLOOD PRESSURE: 121 MMHG

## 2021-08-19 DIAGNOSIS — E28.2 PCOS (POLYCYSTIC OVARIAN SYNDROME): ICD-10-CM

## 2021-08-19 DIAGNOSIS — E55.9 HYPOVITAMINOSIS D: Primary | ICD-10-CM

## 2021-08-19 DIAGNOSIS — R79.89 ELEVATED PROLACTIN LEVEL: ICD-10-CM

## 2021-08-19 PROCEDURE — 99214 OFFICE O/P EST MOD 30 MIN: CPT | Mod: 95 | Performed by: FAMILY MEDICINE

## 2021-08-19 ASSESSMENT — PAIN SCALES - GENERAL: PAINLEVEL: NO PAIN (0)

## 2021-08-19 NOTE — NURSING NOTE
"Chief Complaint   Patient presents with     PCOS       Initial /80   Pulse 75   Temp 98.6  F (37  C)   Wt 59 kg (130 lb)   LMP 08/03/2021   SpO2 98%   BMI 23.03 kg/m   Estimated body mass index is 23.03 kg/m  as calculated from the following:    Height as of 1/7/21: 1.6 m (5' 3\").    Weight as of this encounter: 59 kg (130 lb).  Medication Reconciliation: complete  Abimbola Garcia LPN    "

## 2021-08-26 ENCOUNTER — TELEPHONE (OUTPATIENT)
Dept: FAMILY MEDICINE | Facility: OTHER | Age: 26
End: 2021-08-26
Payer: COMMERCIAL

## 2021-08-26 NOTE — TELEPHONE ENCOUNTER
Lab orders were faxed to [facility] [phone] on 8/19/21, re faxed lab orders 8/26/21

## 2021-08-26 NOTE — TELEPHONE ENCOUNTER
1:20 PM    Reason for Call: Phone Call    Description: Patient calling stating [facility] hasn't received the order labs. Isis is supposed to be drawn on Friday 8/27/2. Fax number for [facility] [phone]    Was an appointment offered for this call? Yes  If yes : Appointment type              Date    Preferred method for responding to this message: Telephone Call  What is your phone number ? [phone]    If we cannot reach you directly, may we leave a detailed response at the number you provided? Yes    Can this message wait until your PCP/provider returns, if available today? YES, No    Yeimi Arreola

## 2021-08-30 ENCOUNTER — TRANSFERRED RECORDS (OUTPATIENT)
Dept: HEALTH INFORMATION MANAGEMENT | Facility: CLINIC | Age: 26
End: 2021-08-30

## 2021-09-09 ENCOUNTER — MYC MEDICAL ADVICE (OUTPATIENT)
Dept: FAMILY MEDICINE | Facility: OTHER | Age: 26
End: 2021-09-09

## 2021-09-14 ENCOUNTER — TELEPHONE (OUTPATIENT)
Dept: FAMILY MEDICINE | Facility: OTHER | Age: 26
End: 2021-09-14

## 2021-09-14 NOTE — TELEPHONE ENCOUNTER
M for patient to call back and reschedule her video visit with Dr. Maguire, originally scheduled 09/15.

## 2021-09-15 NOTE — PROGRESS NOTES
Isis is a 26 year old who is being evaluated via a billable video visit.      How would you like to obtain your AVS? MyChart  If the video visit is dropped, the invitation should be resent by: Text to cell phone: [phone]  Will anyone else be joining your video visit? No      Video Start Time: 1215    Assessment & Plan     Hyperprolactinemia (H)  Likely in part second to her working night shifts  No headaches or vision changes  Start cabergoline with goal prolactin being 15  Follow-up 2 mos after labs done  Once these are normal, will do targeted hormone profile  Video visit 2 wks after labs  Going to [place] for their honeymoon in 11 days, not taking chart with -- discussed what to do if conception occurs  - cabergoline (DOSTINEX) 0.5 MG tablet; Take 0.5 tablets (0.25 mg) by mouth twice a week  - Prolactin; Future  - TSH with free T4 reflex; Future     Review of prior external note(s) from - Outside records from [facility]      Patient was agreeable to this plan and had no further questions.  There are no Patient Instructions on file for this visit.    No follow-ups on file.    Crissy Maguire MD  [facility] - [place]    Subjective   Isis is a 26 year old who presents for the following health issues     HPI     Concern - Results   Onset: 8-30-21  Description: had labs done   Progression of Symptoms:  same  Accompanying Signs & Symptoms: PCOS  Previous history of similar problem: yes  Therapies tried and outcome: Vitamins     Review of Systems   Constitutional, HEENT, cardiovascular, pulmonary, gi and gu systems are negative, except as otherwise noted.      Objective    Vitals - Patient Reported  Pain Score: No Pain (0)      Vitals:  No vitals were obtained today due to virtual visit.    Physical Exam   GENERAL: Healthy, alert and no distress  EYES: Eyes grossly normal to inspection.  No discharge or erythema, or obvious scleral/conjunctival abnormalities.  RESP: No audible wheeze, cough,  or visible cyanosis.  No visible retractions or increased work of breathing.    SKIN: Visible skin clear. No significant rash, abnormal pigmentation or lesions.  NEURO: Cranial nerves grossly intact.  Mentation and speech appropriate for age.  PSYCH: Mentation appears normal, affect normal/bright, judgement and insight intact, normal speech and appearance well-groomed.    No results found for any visits on 09/27/21.          Video-Visit Details    Type of service:  Video Visit    Video End Time:1235    Originating Location (pt. Location): Home    Distant Location (provider location):  [facility]     Platform used for Video Visit: [employer]

## 2021-09-27 ENCOUNTER — VIRTUAL VISIT (OUTPATIENT)
Dept: FAMILY MEDICINE | Facility: OTHER | Age: 26
End: 2021-09-27
Attending: FAMILY MEDICINE
Payer: COMMERCIAL

## 2021-09-27 VITALS — WEIGHT: 135 LBS | BODY MASS INDEX: 23.91 KG/M2

## 2021-09-27 DIAGNOSIS — E22.1 HYPERPROLACTINEMIA (H): Primary | ICD-10-CM

## 2021-09-27 PROCEDURE — 99214 OFFICE O/P EST MOD 30 MIN: CPT | Mod: 95 | Performed by: FAMILY MEDICINE

## 2021-09-27 RX ORDER — CABERGOLINE 0.5 MG/1
0.25 TABLET ORAL
Qty: 12 TABLET | Refills: 0 | Status: SHIPPED | OUTPATIENT
Start: 2021-09-27 | End: 2021-09-30

## 2021-09-27 ASSESSMENT — PAIN SCALES - GENERAL: PAINLEVEL: NO PAIN (0)

## 2021-09-27 NOTE — NURSING NOTE
"Chief Complaint   Patient presents with     Results       Initial Wt 61.2 kg (135 lb)   LMP 09/24/2021   BMI 23.91 kg/m   Estimated body mass index is 23.91 kg/m  as calculated from the following:    Height as of 1/7/21: 1.6 m (5' 3\").    Weight as of this encounter: 61.2 kg (135 lb).  Medication Reconciliation: complete  Abimbola Garcia LPN    "

## 2021-09-30 DIAGNOSIS — E22.1 HYPERPROLACTINEMIA (H): ICD-10-CM

## 2021-09-30 RX ORDER — CABERGOLINE 0.5 MG/1
0.25 TABLET ORAL
Qty: 16 TABLET | Refills: 0 | Status: SHIPPED | OUTPATIENT
Start: 2021-09-30 | End: 2021-12-13

## 2021-09-30 NOTE — TELEPHONE ENCOUNTER
RX was sent on 9.27.21 to pharmacy with a quantity of #12.  Medication can only be dispensed in multiple of 8's.  #16 pended per pharmacy request.

## 2021-10-09 ENCOUNTER — HEALTH MAINTENANCE LETTER (OUTPATIENT)
Age: 26
End: 2021-10-09

## 2021-11-18 ENCOUNTER — TRANSFERRED RECORDS (OUTPATIENT)
Dept: HEALTH INFORMATION MANAGEMENT | Facility: CLINIC | Age: 26
End: 2021-11-18

## 2021-11-18 ENCOUNTER — TELEPHONE (OUTPATIENT)
Dept: FAMILY MEDICINE | Facility: OTHER | Age: 26
End: 2021-11-18
Payer: COMMERCIAL

## 2021-11-18 LAB — TSH SERPL-ACNC: 3.57 UIU/ML (ref 0.27–4.2)

## 2021-12-08 NOTE — PROGRESS NOTES
Isis is a 26 year old who is being evaluated via a billable video visit.      How would you like to obtain your AVS? MyChart  If the video visit is dropped, the invitation should be resent by: Text to cell phone: [phone]  Will anyone else be joining your video visit? No      Video Start Time: 1128    Assessment & Plan     Hyperprolactinemia (H)  Prolactin 1.2 -- change cabergoline to every other week and recheck with next lab draws  - cabergoline (DOSTINEX) 0.5 MG tablet; Take 1 tablet every other week    PCOS (polycystic ovarian syndrome)  Do THP now next cycle -- she may not have a long enough cycle to get all the labs but get as many as possible  Will mail her a copy to take with her to labs  And labs to [facility]    Provider  Link to [facility] Help Grid :[id number]}     Patient was agreeable to this plan and had no further questions.  There are no Patient Instructions on file for this visit.    No follow-ups on file.    Crissy Maguire MD  [facility] - [place]    Subjective   Isis is a 26 year old who presents for the following health issues   HPI     Concern - Hyperprolactinemia and PCOS  Onset: years  Description: working nights  Intensity: mild  Progression of Symptoms:  improving  Accompanying Signs & Symptoms: short cycles  Previous history of similar problem: had one really strange cycle after her honeymoon  Precipitating factors:        Worsened by: working nights, stress  Alleviating factors:        Improved by: cabergoline  Therapies tried and outcome: above    Review of Systems   Constitutional, HEENT, cardiovascular, pulmonary, gi and gu systems are negative, except as otherwise noted.      Objective           Vitals:  No vitals were obtained today due to virtual visit.    Physical Exam   GENERAL: Healthy, alert and no distress  EYES: Eyes grossly normal to inspection.  No discharge or erythema, or obvious scleral/conjunctival abnormalities.  RESP: No audible wheeze, cough, or  visible cyanosis.  No visible retractions or increased work of breathing.    SKIN: Visible skin clear. No significant rash, abnormal pigmentation or lesions.  NEURO: Cranial nerves grossly intact.  Mentation and speech appropriate for age.  PSYCH: Mentation appears normal, affect normal/bright, judgement and insight intact, normal speech and appearance well-groomed.    No results found for any visits on 12/13/21.          Video-Visit Details    Type of service:  Video Visit    Video End Time:1141    Originating Location (pt. Location): Home    Distant Location (provider location):  [facility]     Platform used for Video Visit: [employer]

## 2021-12-13 ENCOUNTER — TELEPHONE (OUTPATIENT)
Dept: FAMILY MEDICINE | Facility: OTHER | Age: 26
End: 2021-12-13

## 2021-12-13 ENCOUNTER — VIRTUAL VISIT (OUTPATIENT)
Dept: FAMILY MEDICINE | Facility: OTHER | Age: 26
End: 2021-12-13
Attending: FAMILY MEDICINE
Payer: COMMERCIAL

## 2021-12-13 DIAGNOSIS — E22.1 HYPERPROLACTINEMIA (H): ICD-10-CM

## 2021-12-13 DIAGNOSIS — E28.2 PCOS (POLYCYSTIC OVARIAN SYNDROME): Primary | ICD-10-CM

## 2021-12-13 PROCEDURE — 99214 OFFICE O/P EST MOD 30 MIN: CPT | Mod: 95 | Performed by: FAMILY MEDICINE

## 2021-12-13 RX ORDER — CABERGOLINE 0.5 MG/1
TABLET ORAL
Qty: 16 TABLET | Refills: 0 | Status: SHIPPED | OUTPATIENT
Start: 2021-12-13

## 2021-12-13 ASSESSMENT — ANXIETY QUESTIONNAIRES
6. BECOMING EASILY ANNOYED OR IRRITABLE: NOT AT ALL
1. FEELING NERVOUS, ANXIOUS, OR ON EDGE: NOT AT ALL
3. WORRYING TOO MUCH ABOUT DIFFERENT THINGS: NOT AT ALL
7. FEELING AFRAID AS IF SOMETHING AWFUL MIGHT HAPPEN: NOT AT ALL
4. TROUBLE RELAXING: NOT AT ALL
2. NOT BEING ABLE TO STOP OR CONTROL WORRYING: NOT AT ALL
5. BEING SO RESTLESS THAT IT IS HARD TO SIT STILL: NOT AT ALL
GAD7 TOTAL SCORE: 0

## 2021-12-13 ASSESSMENT — PATIENT HEALTH QUESTIONNAIRE - PHQ9: SUM OF ALL RESPONSES TO PHQ QUESTIONS 1-9: 0

## 2021-12-14 ASSESSMENT — ANXIETY QUESTIONNAIRES: GAD7 TOTAL SCORE: 0

## 2022-01-15 ENCOUNTER — MYC MEDICAL ADVICE (OUTPATIENT)
Dept: FAMILY MEDICINE | Facility: OTHER | Age: 27
End: 2022-01-15
Payer: COMMERCIAL

## 2022-01-15 DIAGNOSIS — Z34.81 ENCOUNTER FOR SUPERVISION OF OTHER NORMAL PREGNANCY, FIRST TRIMESTER: Primary | ICD-10-CM

## 2022-01-15 DIAGNOSIS — R79.89 LOW SERUM PROGESTERONE: ICD-10-CM

## 2022-01-24 ENCOUNTER — E-VISIT (OUTPATIENT)
Dept: FAMILY MEDICINE | Facility: OTHER | Age: 27
End: 2022-01-24
Attending: FAMILY MEDICINE
Payer: COMMERCIAL

## 2022-01-24 DIAGNOSIS — R79.89 LOW SERUM PROGESTERONE: ICD-10-CM

## 2022-01-24 DIAGNOSIS — E28.2 PCOS (POLYCYSTIC OVARIAN SYNDROME): ICD-10-CM

## 2022-01-24 DIAGNOSIS — N94.3 PREMENSTRUAL SYNDROME: Primary | ICD-10-CM

## 2022-01-24 DIAGNOSIS — Z34.01 ENCOUNTER FOR SUPERVISION OF NORMAL FIRST PREGNANCY IN FIRST TRIMESTER: ICD-10-CM

## 2022-01-24 PROCEDURE — 99421 OL DIG E/M SVC 5-10 MIN: CPT | Performed by: FAMILY MEDICINE

## 2022-01-25 RX ORDER — PROGESTERONE 100 MG/1
100 CAPSULE ORAL AT BEDTIME
Qty: 60 CAPSULE | Refills: 2 | Status: SHIPPED | OUTPATIENT
Start: 2022-01-25 | End: 2024-02-21

## 2022-01-26 NOTE — TELEPHONE ENCOUNTER
Provider E-Visit time total (minutes): 9    ELECTRONIC VISIT DOCUMENTATION:    SUBJECTIVE:  Note above accurately captures the substance of my conversation with the patient.    ASSESSMENT/ PLAN:  1. Premenstrual syndrome  - progesterone (PROMETRIUM) 100 MG capsule; Place 1 capsule (100 mg) vaginally At Bedtime And 1 capsule po qhs  Dispense: 60 capsule; Refill: 2    2. PCOS (polycystic ovarian syndrome)  Now pregnant    3. Low serum progesterone  Start and follow-up 1 months  - progesterone (PROMETRIUM) 100 MG capsule; Place 1 capsule (100 mg) vaginally At Bedtime And 1 capsule po qhs  Dispense: 60 capsule; Refill: 2    4. Encounter for supervision of normal first pregnancy in first trimester  - progesterone (PROMETRIUM) 100 MG capsule; Place 1 capsule (100 mg) vaginally At Bedtime And 1 capsule po qhs  Dispense: 60 capsule; Refill: 2    Crissy Maguire MD  1/26/2022  10:26 AM

## 2022-02-17 ENCOUNTER — TRANSFERRED RECORDS (OUTPATIENT)
Dept: HEALTH INFORMATION MANAGEMENT | Facility: CLINIC | Age: 27
End: 2022-02-17

## 2022-03-26 ENCOUNTER — HEALTH MAINTENANCE LETTER (OUTPATIENT)
Age: 27
End: 2022-03-26

## 2022-09-17 ENCOUNTER — HEALTH MAINTENANCE LETTER (OUTPATIENT)
Age: 27
End: 2022-09-17

## 2023-03-15 ENCOUNTER — ALLIED HEALTH/NURSE VISIT (OUTPATIENT)
Dept: FAMILY MEDICINE | Facility: OTHER | Age: 28
End: 2023-03-15
Attending: FAMILY MEDICINE

## 2023-03-15 DIAGNOSIS — Z11.1 VISIT FOR TB SKIN TEST: Primary | ICD-10-CM

## 2023-03-15 PROCEDURE — 86580 TB INTRADERMAL TEST: CPT

## 2023-03-17 ENCOUNTER — OFFICE VISIT (OUTPATIENT)
Dept: FAMILY MEDICINE | Facility: OTHER | Age: 28
End: 2023-03-17
Attending: FAMILY MEDICINE

## 2023-03-17 DIAGNOSIS — Z11.1 VISIT FOR MANTOUX TEST: Primary | ICD-10-CM

## 2023-03-17 NOTE — PROGRESS NOTES
Patient is here today for a Mantoux (TST) test results.    Did patient return to clinic 48-72 hours from Mantoux (TST) placement: Yes -       Induration Size? Induration <5mm - Enter results in Enter/Edit Activity. Route results to ordering provider.  NEGATIVE MANTOUX    Patient needs form signed? No      Does patient need a two step? No

## 2023-03-17 NOTE — LETTER
[facility] - [place]  [address]  [place] [address]  [phone]          3/17/2023          To Whom it May Concern:     Isis Hurd, female, 1995 has had a mantoux on 3/15/23.      Mantoux result is NEGATIVE:        Please contact me for questions or concerns.        Sincerely,    HC FP NURSE

## 2023-05-06 ENCOUNTER — HEALTH MAINTENANCE LETTER (OUTPATIENT)
Age: 28
End: 2023-05-06

## 2023-11-22 ENCOUNTER — MEDICAL CORRESPONDENCE (OUTPATIENT)
Dept: HEALTH INFORMATION MANAGEMENT | Facility: CLINIC | Age: 28
End: 2023-11-22

## 2023-11-22 ENCOUNTER — TRANSFERRED RECORDS (OUTPATIENT)
Dept: HEALTH INFORMATION MANAGEMENT | Facility: CLINIC | Age: 28
End: 2023-11-22

## 2023-12-20 ENCOUNTER — TRANSFERRED RECORDS (OUTPATIENT)
Dept: HEALTH INFORMATION MANAGEMENT | Facility: CLINIC | Age: 28
End: 2023-12-20
Payer: COMMERCIAL

## 2023-12-26 ENCOUNTER — MEDICAL CORRESPONDENCE (OUTPATIENT)
Dept: HEALTH INFORMATION MANAGEMENT | Facility: CLINIC | Age: 28
End: 2023-12-26
Payer: COMMERCIAL

## 2023-12-27 ENCOUNTER — TRANSCRIBE ORDERS (OUTPATIENT)
Dept: OTHER | Age: 28
End: 2023-12-27

## 2023-12-27 DIAGNOSIS — R76.8 OTHER SPECIFIED ABNORMAL IMMUNOLOGICAL FINDINGS IN SERUM: Primary | ICD-10-CM

## 2023-12-27 DIAGNOSIS — Z34.90 ENCOUNTER FOR SUPERVISION OF NORMAL PREGNANCY, UNSPECIFIED, UNSPECIFIED TRIMESTER: ICD-10-CM

## 2024-01-03 NOTE — CONFIDENTIAL NOTE
DIAGNOSIS:    Other specified abnormal immunological findings in serum   Encounter for supervision of normal pregnancy, unspecified, unspecified trimester      Appt Date:  02.21.2024    NOTES STATUS DETAILS   OFFICE NOTE from referring provider Received 12.27.2023   Keri Witt APRN CNM   [facility]      OFFICE NOTES from other specialists     DISCHARGE SUMMARY from hospital     MEDICATION LIST Internal    LIVER BIOSPY (IF APPLICABLE)      PATHOLOGY REPORTS      IMAGING     ENDOSCOPY (IF AVAILABLE)     COLONOSCOPY (IF AVAILABLE)     ULTRASOUND LIVER     CT OF ABDOMEN     MRI OF LIVER     FIBROSCAN, US ELASTOGRAPHY, FIBROSIS SCAN, MR ELASTOGRAPHY     LABS     HEPATIC PANEL (LIVER PANEL)     BASIC METABOLIC PANEL     COMPLETE METABOLIC PANEL     COMPLETE BLOOD COUNT (CBC)     INTERNATIONAL NORMALIZED RATIO (INR)     HEPATITIS C ANTIBODY     HEPATITIS C VIRAL LOAD/PCR     HEPATITIS C GENOTYPE     HEPATITIS B SURFACE ANTIGEN Received 11.22.2023   HEPATITIS B SURFACE ANTIBODY Received 11.22.2023   HEPATITIS B DNA QUANT LEVEL     HEPATITIS B CORE ANTIBODY

## 2024-02-12 ENCOUNTER — TRANSFERRED RECORDS (OUTPATIENT)
Dept: HEALTH INFORMATION MANAGEMENT | Facility: CLINIC | Age: 29
End: 2024-02-12
Payer: COMMERCIAL

## 2024-02-13 DIAGNOSIS — R76.8 HEPATITIS B CORE ANTIBODY POSITIVE: Primary | ICD-10-CM

## 2024-02-13 DIAGNOSIS — Z34.92 SECOND TRIMESTER PREGNANCY: ICD-10-CM

## 2024-02-19 ENCOUNTER — MEDICAL CORRESPONDENCE (OUTPATIENT)
Dept: HEALTH INFORMATION MANAGEMENT | Facility: CLINIC | Age: 29
End: 2024-02-19
Payer: COMMERCIAL

## 2024-02-20 ENCOUNTER — TRANSCRIBE ORDERS (OUTPATIENT)
Dept: MATERNAL FETAL MEDICINE | Facility: CLINIC | Age: 29
End: 2024-02-20
Payer: COMMERCIAL

## 2024-02-20 ENCOUNTER — MEDICAL CORRESPONDENCE (OUTPATIENT)
Dept: HEALTH INFORMATION MANAGEMENT | Facility: CLINIC | Age: 29
End: 2024-02-20
Payer: COMMERCIAL

## 2024-02-20 ENCOUNTER — PRE VISIT (OUTPATIENT)
Dept: MATERNAL FETAL MEDICINE | Facility: CLINIC | Age: 29
End: 2024-02-20
Payer: COMMERCIAL

## 2024-02-20 DIAGNOSIS — O26.90 PREGNANCY RELATED CONDITION, ANTEPARTUM: Primary | ICD-10-CM

## 2024-02-21 ENCOUNTER — LAB (OUTPATIENT)
Dept: LAB | Facility: CLINIC | Age: 29
End: 2024-02-21
Attending: PHYSICIAN ASSISTANT
Payer: COMMERCIAL

## 2024-02-21 ENCOUNTER — OFFICE VISIT (OUTPATIENT)
Dept: GASTROENTEROLOGY | Facility: CLINIC | Age: 29
End: 2024-02-21
Payer: COMMERCIAL

## 2024-02-21 ENCOUNTER — PRE VISIT (OUTPATIENT)
Dept: GASTROENTEROLOGY | Facility: CLINIC | Age: 29
End: 2024-02-21

## 2024-02-21 ENCOUNTER — TELEPHONE (OUTPATIENT)
Dept: GASTROENTEROLOGY | Facility: CLINIC | Age: 29
End: 2024-02-21

## 2024-02-21 VITALS
HEART RATE: 110 BPM | SYSTOLIC BLOOD PRESSURE: 138 MMHG | DIASTOLIC BLOOD PRESSURE: 90 MMHG | BODY MASS INDEX: 27.46 KG/M2 | OXYGEN SATURATION: 100 % | WEIGHT: 155 LBS | TEMPERATURE: 98.3 F

## 2024-02-21 DIAGNOSIS — Z34.92 SECOND TRIMESTER PREGNANCY: ICD-10-CM

## 2024-02-21 DIAGNOSIS — R76.8 OTHER SPECIFIED ABNORMAL IMMUNOLOGICAL FINDINGS IN SERUM: ICD-10-CM

## 2024-02-21 DIAGNOSIS — R76.8 HEPATITIS B CORE ANTIBODY POSITIVE: ICD-10-CM

## 2024-02-21 DIAGNOSIS — R76.8 HEPATITIS B CORE ANTIBODY POSITIVE: Primary | ICD-10-CM

## 2024-02-21 DIAGNOSIS — R74.8 ALKALINE PHOSPHATASE ELEVATION: ICD-10-CM

## 2024-02-21 PROBLEM — Q51.28: Status: ACTIVE | Noted: 2024-02-21

## 2024-02-21 PROBLEM — E22.1 HYPERPROLACTINEMIA (H): Status: ACTIVE | Noted: 2024-02-21

## 2024-02-21 PROBLEM — O35.BXX0 ECHOGENIC FOCUS OF HEART OF FETUS AFFECTING ANTEPARTUM CARE OF MOTHER: Status: ACTIVE | Noted: 2024-02-21

## 2024-02-21 PROBLEM — O42.90 PROM (PREMATURE RUPTURE OF MEMBRANES): Status: ACTIVE | Noted: 2024-02-21

## 2024-02-21 PROBLEM — R79.89 INCREASED PROLACTIN LEVEL: Status: ACTIVE | Noted: 2023-05-16

## 2024-02-21 LAB
ALBUMIN SERPL BCG-MCNC: 3.9 G/DL (ref 3.5–5.2)
ALP SERPL-CCNC: 228 U/L (ref 40–150)
ALT SERPL W P-5'-P-CCNC: <5 U/L (ref 0–50)
ANION GAP SERPL CALCULATED.3IONS-SCNC: 9 MMOL/L (ref 7–15)
AST SERPL W P-5'-P-CCNC: 17 U/L (ref 0–45)
BILIRUB DIRECT SERPL-MCNC: <0.2 MG/DL (ref 0–0.3)
BILIRUB SERPL-MCNC: 0.3 MG/DL
BUN SERPL-MCNC: 5.4 MG/DL (ref 6–20)
CALCIUM SERPL-MCNC: 8.8 MG/DL (ref 8.6–10)
CHLORIDE SERPL-SCNC: 104 MMOL/L (ref 98–107)
CREAT SERPL-MCNC: 0.57 MG/DL (ref 0.51–0.95)
DEPRECATED HCO3 PLAS-SCNC: 22 MMOL/L (ref 22–29)
EGFRCR SERPLBLD CKD-EPI 2021: >90 ML/MIN/1.73M2
ERYTHROCYTE [DISTWIDTH] IN BLOOD BY AUTOMATED COUNT: 14.1 % (ref 10–15)
GLUCOSE SERPL-MCNC: 93 MG/DL (ref 70–99)
HBV CORE AB SERPL QL IA: NONREACTIVE
HBV SURFACE AB SERPL IA-ACNC: 8.16 M[IU]/ML
HBV SURFACE AB SERPL IA-ACNC: NONREACTIVE M[IU]/ML
HBV SURFACE AG SERPL QL IA: NONREACTIVE
HCT VFR BLD AUTO: 37.9 % (ref 35–47)
HCV AB SERPL QL IA: NONREACTIVE
HGB BLD-MCNC: 13.3 G/DL (ref 11.7–15.7)
INR PPP: 1.04 (ref 0.85–1.15)
MCH RBC QN AUTO: 30.6 PG (ref 26.5–33)
MCHC RBC AUTO-ENTMCNC: 35.1 G/DL (ref 31.5–36.5)
MCV RBC AUTO: 87 FL (ref 78–100)
PLATELET # BLD AUTO: 180 10E3/UL (ref 150–450)
POTASSIUM SERPL-SCNC: 3.7 MMOL/L (ref 3.4–5.3)
PROT SERPL-MCNC: 7.2 G/DL (ref 6.4–8.3)
RBC # BLD AUTO: 4.35 10E6/UL (ref 3.8–5.2)
SODIUM SERPL-SCNC: 135 MMOL/L (ref 135–145)
WBC # BLD AUTO: 11.8 10E3/UL (ref 4–11)

## 2024-02-21 PROCEDURE — 82248 BILIRUBIN DIRECT: CPT | Performed by: PATHOLOGY

## 2024-02-21 PROCEDURE — 80053 COMPREHEN METABOLIC PANEL: CPT | Performed by: PATHOLOGY

## 2024-02-21 PROCEDURE — 87340 HEPATITIS B SURFACE AG IA: CPT | Performed by: PHYSICIAN ASSISTANT

## 2024-02-21 PROCEDURE — 99213 OFFICE O/P EST LOW 20 MIN: CPT | Performed by: PHYSICIAN ASSISTANT

## 2024-02-21 PROCEDURE — 84080 ASSAY ALKALINE PHOSPHATASES: CPT | Mod: 90 | Performed by: PATHOLOGY

## 2024-02-21 PROCEDURE — 86803 HEPATITIS C AB TEST: CPT | Performed by: PHYSICIAN ASSISTANT

## 2024-02-21 PROCEDURE — 86707 HEPATITIS BE ANTIBODY: CPT | Mod: 90 | Performed by: PATHOLOGY

## 2024-02-21 PROCEDURE — 36415 COLL VENOUS BLD VENIPUNCTURE: CPT | Performed by: PATHOLOGY

## 2024-02-21 PROCEDURE — 85610 PROTHROMBIN TIME: CPT | Performed by: PATHOLOGY

## 2024-02-21 PROCEDURE — 85027 COMPLETE CBC AUTOMATED: CPT | Performed by: PATHOLOGY

## 2024-02-21 PROCEDURE — 86704 HEP B CORE ANTIBODY TOTAL: CPT | Performed by: PHYSICIAN ASSISTANT

## 2024-02-21 PROCEDURE — 87350 HEPATITIS BE AG IA: CPT | Mod: 90 | Performed by: PATHOLOGY

## 2024-02-21 PROCEDURE — 99204 OFFICE O/P NEW MOD 45 MIN: CPT | Performed by: PHYSICIAN ASSISTANT

## 2024-02-21 PROCEDURE — 86706 HEP B SURFACE ANTIBODY: CPT | Performed by: PHYSICIAN ASSISTANT

## 2024-02-21 PROCEDURE — 87517 HEPATITIS B DNA QUANT: CPT | Performed by: PHYSICIAN ASSISTANT

## 2024-02-21 PROCEDURE — 99000 SPECIMEN HANDLING OFFICE-LAB: CPT | Performed by: PATHOLOGY

## 2024-02-21 ASSESSMENT — PAIN SCALES - GENERAL: PAINLEVEL: NO PAIN (0)

## 2024-02-21 NOTE — TELEPHONE ENCOUNTER
Hepatology office visit note from 2/21/24 faxed to [facility] @ [phone] per provider.    SHAHRAM SkaggsN, RN, PHN  Hepatology Clinic  [facility]  [facility]

## 2024-02-21 NOTE — PROGRESS NOTES
"Hepatology Clinic Note  Isis Hurd   Date of Birth 1995    REASON FOR CONSULT: Hepatitis B Core Ab +  REFERRING PROVIDER: Keri Witt APRN CNM         Assessment/plan:     Isis Hurd is a 28 YO F presenting for consult after having incidentally + Hep B Core Ab. She is currently 21 weeks pregnant. This is her 2nd pregnancy. Never diagnosed with Hep B or any liver disease in the past. Was vaccinated against Hep B as a child (1996 per chart review). Fam hx + for maternal grandfather with alcoholic cirrhosis; no other known liver dx in family. Currently only taking prenatal vitamin. Works as a nurse.    + Hepatitis B core Ab   Mildly elevated alk phos (alkphos will be normally elevated during pregnancy due to placental production)  >(no known hx of hep b; diff dx includes: resolving acute infection, \"low level\" chronic infection, false + anti Hbc, resolved infection)  - Hep B Sag: negative   - Hep B Sab: negative   - Hep B Core Ab: + (Nov 2023)  - E antigen: pending  - E antibody: pending  - Diagnosed with Hep B: never  - Prior liver biopsy: none  - Prior treatments: none  - HBV DNA: pending   - ALT/AST: <5, 17  - Fibrosis Assessment: none  - Liver Function: normal on today's lab work (INR, Tbili, albumin, PLT WNL)    - Reviewed recents labs   - Pending additional labs: Hep B DNA, hep BE antigen/antibody, hep B surface antibody, hep B core antibody, hep B surface antigen and alk phos isoenzymes   > Next steps dependent on results   - No additional imaging ordered at this time of liver  - Continue to follow with OB/GYN through [facility] in [place], [place] routinely as recommended  - Continue on daily prenatal    - Hepatology follow up TBD based on lab results     Viri Alvarenga PA-C   [facility] Hepatology  -----------------------------------------------------       HPI:     Isis Hurd is a 28 YO F presenting for eval of + Hep B Core Antibody.     Patient presents today for " consult after being referred by her midwife team at [facility] in [place], [place].  Patient states she is currently 21 weeks pregnant.  This is her second pregnancy.  Has a toddler at home.  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    Has never been diagnosed with hepatitis B or any other liver disease/condition in the past.  Tells me she was vaccinated against hepatitis B as a child.  Patient tells me hepatitis B core antibody was not drawn with her last pregnancy.  Other than maternal grandfather with a history of alcoholic cirrhosis, no known family history of liver disease.  Patient has never had a liver biopsy.    Currently she is just taking prenatal vitamin.    No recent fevers, chills, yellowing of eyes or skin, chest pain or shortness of breath.  No swelling of feet or ankles.  No abdominal pain.  Admits she lost about 5 pounds in her first trimester.  Weight lately has been stable around 155 LB.  Patient admits she has had severe food aversions with this pregnancy.  Admits they are slowly improving.  Otherwise admits to having normal pregnancy symptoms.    No current/former tobacco use.  No current alcohol consumption.  Prior to this pregnancy, admits to very rare alcohol consumption.  No history of IV drug use.  No current illicit drug use.    PMH:  has a past medical history of Concussion without loss of consciousness, MICHAEL (generalized anxiety disorder), Menorrhagia with regular cycle, PCOS (polycystic ovarian syndrome), and Septate uterus.    SMH:  has a past surgical history that includes orthopedic surgery and GYN surgery (06/03/2021).     Medications:   Current Outpatient Medications   Medication    Ascorbic Acid (VITAMIN C) 500 MG CHEW    cabergoline (DOSTINEX) 0.5 MG tablet    Inositol 324 MG TABS    Multiple Vitamins-Minerals (OPTIVITE P.M.T.) TABS    Prenatal Vit-Fe Fumarate-FA (PRENATAL MULTIVITAMIN W/IRON) 27-0.8 MG tablet    progesterone (PROMETRIUM) 100 MG  "capsule    Vitamin D3 (CHOLECALCIFEROL) 125 MCG (5000 UT) tablet     No current facility-administered medications for this visit.     Hepatitis B:   No results found for: \"HEPBANG\", \"HBCAB\", \"AUSAB\", \"HCVAB\", \"HCQTIINST\", \"HDAAB\"  No results found for: \"HBQRESINST\", \"HBQRES\"  No results found for: \"HBEABY\", \"HBEAGN\"         Medications:     Current Outpatient Medications   Medication    Prenatal Vit-Fe Fumarate-FA (PRENATAL MULTIVITAMIN W/IRON) 27-0.8 MG tablet    cabergoline (DOSTINEX) 0.5 MG tablet     No current facility-administered medications for this visit.          Allergies:     Allergies   Allergen Reactions    Adhesive Tape Itching     Steri strips     Food      Walnuts and pineapple sores in mouth and throat swelling    Iodine Rash     Betadine          Social History:     Social History     Socioeconomic History    Marital status:      Spouse name: Ernesto    Number of children: 0    Years of education: 16    Highest education level: Bachelor's degree (e.g., BA, AB, BS)   Occupational History    Occupation: rn     Employer: [employer]   Tobacco Use    Smoking status: Never    Smokeless tobacco: Never   Vaping Use    Vaping Use: Never used   Substance and Sexual Activity    Alcohol use: Not Currently     Comment: before pregnancy rarely    Drug use: Never     Comment: no hx IVDU    Sexual activity: Not on file   Other Topics Concern    Not on file   Social History Narrative     -- n/a    Caffeine -- 1-3/day    Concussion -- yes    Preload  02/6/2013     Social Determinants of Health     Financial Resource Strain: Not on file   Food Insecurity: Not on file   Transportation Needs: Not on file   Physical Activity: Sufficiently Active (1/7/2021)    Exercise Vital Sign     Days of Exercise per Week: 6 days     Minutes of Exercise per Session: 30 min   Stress: Not on file   Social Connections: Unknown (1/7/2021)    Social Connection and Isolation Panel [NHANES]     Frequency of Communication " "with Friends and Family: Not asked     Frequency of Social Gatherings with Friends and Family: Not asked     Attends Jainism Services: Not asked     Active Member of Clubs or Organizations: Not asked     Attends Club or Organization Meetings: Not asked     Marital Status: Not asked   Interpersonal Safety: Not At Risk (1/7/2021)    Humiliation, Afraid, Rape, and Kick questionnaire     Fear of Current or Ex-Partner: No     Emotionally Abused: No     Physically Abused: No     Sexually Abused: No   Housing Stability: Not on file          Family History:     Family History   Problem Relation Age of Onset    Endometriosis Mother     Sjogren's Mother     Depression Father     Family History Negative Brother     Sjogren's Maternal Grandmother     Alcoholism Maternal Grandfather     Cirrhosis Maternal Grandfather     Influenza/Pneumonia Paternal Grandmother 93    Pancreatitis Paternal Grandfather           Review of Systems:   GEN: See HPI         Physical Exam:     Vital signs:  Temp: 98.3  F (36.8  C) Temp src: Oral BP: (!) 138/90 Pulse: 110     SpO2: 100 %       Weight: 70.3 kg (155 lb)  Estimated body mass index is 27.46 kg/m  as calculated from the following:    Height as of 1/7/21: 1.6 m (5' 3\").    Weight as of this encounter: 70.3 kg (155 lb).  Gen: A&Ox3, NAD, well developed  HEENT: non-icteric   CV: RRR, no overt murmurs  Lung: CTA posteriorly, no wheezing or crackles  Abd: soft, NT, ND, liver is not palpable  Ext: no edema, intact pulses  Skin: No jaundice  Neuro: grossly intact  Psych: appropriate mood and affects         Data:   Reviewed in person and significant for:    Lab Results   Component Value Date     02/21/2024     05/21/2021      Lab Results   Component Value Date    POTASSIUM 3.7 02/21/2024    POTASSIUM 3.8 05/21/2021     Lab Results   Component Value Date    CHLORIDE 104 02/21/2024    CHLORIDE 104 05/21/2021     Lab Results   Component Value Date    CO2 22 02/21/2024    CO2 25 " "05/21/2021     Lab Results   Component Value Date    BUN 5.4 02/21/2024    BUN 11 05/21/2021     Lab Results   Component Value Date    CR 0.57 02/21/2024    CR 0.69 05/21/2021       Lab Results   Component Value Date    WBC 11.8 02/21/2024    WBC 7.1 05/21/2021     Lab Results   Component Value Date    HGB 13.3 02/21/2024    HGB 13.8 05/21/2021     Lab Results   Component Value Date    HCT 37.9 02/21/2024    HCT 39.8 05/21/2021     Lab Results   Component Value Date    MCV 87 02/21/2024    MCV 92 05/21/2021     Lab Results   Component Value Date     02/21/2024     05/21/2021       Lab Results   Component Value Date    AST 17 02/21/2024    AST 21 05/21/2021     Lab Results   Component Value Date    ALT <5 02/21/2024    ALT 25 05/21/2021     No results found for: \"BILICONJ\"   Lab Results   Component Value Date    BILITOTAL 0.3 02/21/2024    BILITOTAL 0.7 05/21/2021     Lab Results   Component Value Date    ALBUMIN 3.9 02/21/2024    ALBUMIN 4.0 05/21/2021     Lab Results   Component Value Date    PROTTOTAL 7.2 02/21/2024    PROTTOTAL 8.0 05/21/2021      Lab Results   Component Value Date    ALKPHOS 228 02/21/2024    ALKPHOS 49 05/21/2021     Lab Results   Component Value Date    INR 1.04 02/21/2024     IMAGING:     No recent imaging of liver    "

## 2024-02-21 NOTE — Clinical Note
Please send note to OB/GYN team at [facility] (fax # [phone]).   Thanks!  Viri Alvarenga PA-C

## 2024-02-21 NOTE — NURSING NOTE
Chief Complaint   Patient presents with    RECHECK     PA-C for NEW LIVER     /75 (BP Location: Right arm, Patient Position: Sitting, Cuff Size: Adult Regular)   Pulse 110   Temp 98.3  F (36.8  C) (Oral)   Wt 70.3 kg (155 lb)   LMP 09/23/2023   SpO2 100%   BMI 27.46 kg/m      Amish Sabillon CMA on 2/21/2024 at 8:52 AM

## 2024-02-22 LAB
HBV DNA SERPL NAA+PROBE-ACNC: NOT DETECTED IU/ML
HBV E AB SERPL QL IA: NEGATIVE
HBV E AG SERPL QL IA: NEGATIVE

## 2024-02-27 ENCOUNTER — OFFICE VISIT (OUTPATIENT)
Dept: MATERNAL FETAL MEDICINE | Facility: CLINIC | Age: 29
End: 2024-02-27
Attending: OBSTETRICS & GYNECOLOGY
Payer: COMMERCIAL

## 2024-02-27 ENCOUNTER — HOSPITAL ENCOUNTER (OUTPATIENT)
Dept: ULTRASOUND IMAGING | Facility: CLINIC | Age: 29
Discharge: HOME | End: 2024-02-27
Attending: OBSTETRICS & GYNECOLOGY
Payer: COMMERCIAL

## 2024-02-27 DIAGNOSIS — O26.90 PREGNANCY RELATED CONDITION, ANTEPARTUM: ICD-10-CM

## 2024-02-27 DIAGNOSIS — O35.EXX0 ENCOUNTER FOR REPEAT ULTRASOUND OF FETAL PYELECTASIS, ANTEPARTUM, SINGLE OR UNSPECIFIED FETUS: ICD-10-CM

## 2024-02-27 DIAGNOSIS — O35.EXX0 PYELECTASIS OF FETUS ON PRENATAL ULTRASOUND: Primary | ICD-10-CM

## 2024-02-27 LAB
ALP BONE SERPL-CCNC: 55 U/L
ALP LIVER SERPL-CCNC: ABNORMAL U/L
ALP OTHER SERPL-CCNC: 182 U/L
ALP SERPL-CCNC: 237 U/L

## 2024-02-27 PROCEDURE — 76811 OB US DETAILED SNGL FETUS: CPT

## 2024-02-27 PROCEDURE — 76811 OB US DETAILED SNGL FETUS: CPT | Mod: 26 | Performed by: OBSTETRICS & GYNECOLOGY

## 2024-02-27 PROCEDURE — 96040 HC GENETIC COUNSELING, EACH 30 MINUTES: CPT

## 2024-02-27 NOTE — PROGRESS NOTES
"Please see \"Imaging\" tab under \"Chart Review\" for details of today's US at the [facility].    Elgin Whitley MD  Maternal-Fetal Medicine    "

## 2024-02-27 NOTE — NURSING NOTE
Patient presents to [facility] for GC/L2 at 22w3d due to EIF, bilateral UTD. Positive fetal movement. Denies LOF, vaginal bleeding or cramping/contractions. SBAR given to VIVIAN MD, see their note in Epic.

## 2024-02-28 NOTE — PROGRESS NOTES
"[facility]  Genetic Counseling Consult    Patient:  Isis Hurd YOB: 1995   Date of Service:  24   MRN: [id number]    Isis was seen at the [facility] for genetic consultation. The indication for genetic counseling is abnormal fetal ultrasound at outside clinic and personal medical history. The patient was accompanied to this visit by their Ernesto.    IMPRESSION/ PLAN   1. Isis has not had genetic screening in this pregnancy and declines options discussed today.    2. Isis had an ultrasound today. Please see the ultrasound report for further details.    3. Further recommendation include a follow-up ultrasound with [facility]. The upcoming ultrasound has been scheduled for 2024.    PREGNANCY HISTORY   /Parity:       Isis's pregnancy history is significant for:   Term: 10/2/2022    CURRENT PREGNANCY   Current Age: 29 year old     Age at Delivery: 29 year old    KATHY: 2024, by Last Menstrual Period                                     Gestational Age: 22w4d    This pregnancy is a single gestation.     This pregnancy was conceived spontaneously.    Isis reports no bleeding, complications, illnesses, fever or exposure concerns with this pregnancy.     MEDICAL HISTORY   Isis s reported medical history is not expected to impact pregnancy management or risks to fetal development. Isis reports that she showed prolonged long QT intervals after a surgery but was evaluated by a cardiologist and was diagnosed with a benign heart murmur.    FAMILY HISTORY   A three-generation pedigree was obtained today and is scanned under the \"Media\" tab in Epic. The family history was reported by Isis and their partner.    The following significant findings were reported today:   Isis and Ernesto have 16 month old who is alive and healthy   Isis's maternal aunt had breast cancers in her 40's  Isis's maternal uncle passed away at 13 " from drowning. Isis reports he fell through the ice.     We discussed how most cancer seen in families occurs sporadically, but about 5-10% may be due to an underlying genetic etiology. The couple was encouraged to share this family history information with their primary care providers to ensure appropriate screening. They were also made aware of the [facility]'s cancer risk management clinic if they or their family members are interested in more information.    Isis reports that there have been no sudden cardiac events in her family and no one else has had prolonged QT intervals.    Otherwise, the reported family history is unremarkable for multiple miscarriages, stillbirths, birth defects, intellectual disabilities, known genetic conditions, and consanguinity.       RISK ASSESSMENT FOR CHROMOSOME CONDITIONS   We explained that the risk for fetal chromosome abnormalities increases with maternal age. We discussed specific features of common chromosome abnormalities, including Down syndrome, trisomy 13, trisomy 18, and sex chromosome trisomies.    At age 29 at midtrimester, the risk to have a baby with Down syndrome is 1 in 855.  At age 29 at midtrimester, the risk to have a baby with any chromosome abnormality is 1 in 428.     The outside ultrasound identified an echogenic intracardiac focus or EIF, which can be described as a small bright spot in the heart and pyelectasis which can also be called hydronephrosis or urinary tract dilation.    We discussed common aneuploidy markers identified on level II ultrasounds. Aneuploidy means an abnormal number of chromosomes. These markers are used to adjust age-related risk for chromosome abnormalities. While markers are often seen in babies without a chromosome condition, they are seen slightly more often in babies with a condition. Therefore, each marker is associated with a different increase in risk but alone not diagnose a condition, such a Down syndrome.      We discussed this specific finding is not a birth defect and does not cause concerns on its own. An EIF is not typically associated with a heart defect or any heart problems. This finding can be more common (up to 30%), and therefore, less concerning, if one or both of the parents have  ethnicity.     An EIF can be seen in 3-5% fetus without Down syndrome. However, in the second trimester ultrasound about 21-28% of fetuses with Down syndrome have an EIF.   Pyelectasis can be found in one (unilateral) or both (bilateral) of the kidneys. The enlargement is due to an accumulation of fluid possibly due to a blockage or abnormality of the ureter of the kidney, which could require medical attention after birth. Typically pyelectasis resolves later in pregnancy and requires no further medical attention.  However, occassionally additional fluid can accumulate in the kidneys over the course of pregnancy if there is a blockage or other abnormality of the ureter or kidney; if that happens, this can sometimes require medical attention after birth.  Because of this, usually another ultrasound is recommended for later in pregnancy to again assess the fetal kidneys typically around 32 weeks gestation.  .    We discussed this specific finding is not a birth defect and does not cause concerns on its own. An EIF is not typically associated with a heart defect or any heart problems. This finding can be more common (up to 30%), and therefore, less concerning, if one or both of the parents have  ethnicity.     An EIF can be seen in 3-5% fetus without Down syndrome. However, in the second trimester ultrasound about 21-28% of fetuses with Down syndrome have an EIF.     We discussed the following risk adjustment:  Isis's age-related risk for Down syndrome is 1/855. The likelihood ratio for an EIF is 1.8 and pyelectasis is 1.5. Therefore the revised chance for Down syndrome in the current pregnancy is ~1/317 or 0.32%.      We  discussed the options for more information including a cell-free DNA non-invasive screen (NIPT) or a diagnostic option such as an amniocentesis. We discussed that a screen would provide a more accurate risk assessment for this pregnancy by analyzing the cell-free DNA from the placenta in maternal blood. However, only an amniocentesis can provide diagnostic information by directly analyzing the chromosomes from fetal cells in the amniotic fluid.     Isis has not had genetic screening in this pregnancy and declines options discussed today.     RISK ASSESSMENT FOR INHERITED CONDITIONS   We discussed that every pregnancy has a chance to have an inherited single-gene condition, even when there is no family history of that condition. In fact, approximately 90% of couples at an increased reproductive risk for an inherited condition have no family history of that condition. The average person may be a carrier for 5-10 different genetic variants that can increase the chance for their pregnancies to have that condition. We discussed autosomal recessive conditions and X-linked conditions. Autosomal recessive conditions happen when a mutation has been inherited from the egg and sperm and include conditions like cystic fibrosis, thalassemia, hearing loss, spinal muscular atrophy, and more. X-linked conditions happen when a mutation has been inherited from the egg and include conditions like fragile X syndrome.     We reviewed that when both biological parents carry a harmful genetic change in a gene associated with autosomal recessive inheritance, each of their pregnancies has a 1 in 4 (25%) chance to be affected by that condition. With x-linked conditions, the specific risk generally depends on the chromosomal sex of the fetus, with XY individuals (generally male) being most severely affected.      screening was reviewed. About [place]  Screening    The patient does NOT have a family history of known inherited  conditions. This does NOT mean the patient and/or their partner is not a carrier of a condition. Approximately 90% of couples at an increased reproductive risk for an inherited condition have no family history of that condition. . The patient has not had carrier screening previously.  The patient declined the carrier screening options. They are aware the option will remain, and they can contact us if they would like to pursue screening. See below for the more detailed information we dicussed.    We discussed that expanded carrier screening is designed to identify carrier status for conditions that are primarily childhood or adolescent onset. Expanded carrier screening does not evaluate for adult-onset conditions such as hereditary cancer syndromes, dementia/ Alzheimer's disease, or cardiovascular disease risk factors. Additionally, expanded carrier screening is not comprehensive for all known genetic diseases or inherited conditions. It will not intentionally screen for autosomal dominant conditions. This is a screening test, and residual carrier status risk figures will be provided to the patient after results become available. Carrier screening is not meant to diagnose the patient with a condition, and generally carriers are asymptomatic. However, certain genes may confer increased risks for various health concerns in carriers (including, but not limited to: PAM, DMD, FMR1).    GENETIC TESTING OPTIONS   Genetic testing during a pregnancy includes screening and diagnostic procedures.      Screening tests are non-invasive which means no risk to the pregnancy and includes ultrasounds and blood work. The benefits and limitations of screening were reviewed. Screening tests provide a risk assessment (chance) specific to the pregnancy for certain fetal chromosome abnormalities but cannot definitively diagnose or exclude a fetal chromosome abnormality. Follow-up genetic counseling and consideration of diagnostic testing  is recommended with any abnormal screening result. Diagnostic testing during a pregnancy is more certain and can test for more conditions. However, the tests do have a risk of miscarriage that requires careful consideration. These tests can detect fetal chromosome abnormalities with greater than 99% certainty. Results can be compromised by maternal cell contamination or mosaicism and are limited by the resolution of current genetic testing technology.     There is no screening or diagnostic test that detects all forms of birth defects or intellectual disability.     We discussed the following screening options:   Non-invasive prenatal testing (NIPT)  Also called cell-free DNA screening because it detect chromosome fragments from the placenta in the pregnant person's blood.  Can be done any time after 10 weeks gestation.  Screens for trisomy 21, trisomy 18, trisomy 13, and sex chromosome aneuploidies. ACMG also recommends consideration of screening for 22q11.2 microdeletion syndrome.  Does not screen for all known chromosomal conditions.  Even with negative results, a residual risk for screened conditions remains.  Cannot screen for open neural tube defects, maternal serum AFP after 15 weeks is recommended    Carrier screening  Risk assessment for certain autosomal recessive and x-linked conditions. These conditions are generally infantile- or childhood-onset conditions.   Can be done any time during the pregnancy or prior to pregnancy.  Can screen for over 500 different genetic conditions.  Is not intended to diagnosis a condition in the carrier parent.    Even with negative results, a residual risk for screened conditions remains.      We discussed the following ultrasound options:  Fetal Echocardiogram  Ultrasound done between 22-24 weeks gestation  Screen for heart defects  Recommended if there are concerns about the heart or other indications like IVF pregnancy or maternal diabetes    We discussed the following  diagnostic options:   Amniocentesis  Invasive diagnostic procedure done after 15 weeks gestation  The procedure collects a small sample of amniotic fluid for the purpose of chromosomal testing and/or other genetic testing  Diagnostic result; more than 99% sensitivity for fetal chromosome abnormalities  Testing for AFP in the amniotic fluid can test for open neural tube defects      It was a pleasure to be involved with Isis [facility]. Face-to-face time of the meeting was 30 minutes.    KRISTYN MATOS MS, [facility]  Genetic Counselor  [facility]  Maternal Fetal Medicine  Office: [phone]   [facility]: [phone]   Fax: [phone]  [facility]

## 2024-04-23 ENCOUNTER — TRANSFERRED RECORDS (OUTPATIENT)
Dept: HEALTH INFORMATION MANAGEMENT | Facility: CLINIC | Age: 29
End: 2024-04-23
Payer: COMMERCIAL

## 2024-05-07 ENCOUNTER — OFFICE VISIT (OUTPATIENT)
Dept: MATERNAL FETAL MEDICINE | Facility: CLINIC | Age: 29
End: 2024-05-07
Attending: OBSTETRICS & GYNECOLOGY
Payer: COMMERCIAL

## 2024-05-07 ENCOUNTER — HOSPITAL ENCOUNTER (OUTPATIENT)
Dept: ULTRASOUND IMAGING | Facility: CLINIC | Age: 29
Discharge: HOME | End: 2024-05-07
Attending: OBSTETRICS & GYNECOLOGY
Payer: COMMERCIAL

## 2024-05-07 DIAGNOSIS — O35.EXX0 ENCOUNTER FOR REPEAT ULTRASOUND OF FETAL PYELECTASIS, ANTEPARTUM, SINGLE OR UNSPECIFIED FETUS: ICD-10-CM

## 2024-05-07 DIAGNOSIS — O35.EXX0 PYELECTASIS OF FETUS ON PRENATAL ULTRASOUND: Primary | ICD-10-CM

## 2024-05-07 PROCEDURE — 76816 OB US FOLLOW-UP PER FETUS: CPT

## 2024-05-07 PROCEDURE — 76816 OB US FOLLOW-UP PER FETUS: CPT | Mod: 26 | Performed by: OBSTETRICS & GYNECOLOGY

## 2024-05-07 NOTE — NURSING NOTE
Patient presents to [facility] for RL2 at 32w3d due to bilateral UTDA-1. Positive fetal movement. Denies LOF, vaginal bleeding or cramping/contractions. SBAR given to ADAN CORONADO, see their note in Epic.

## 2024-05-07 NOTE — PROGRESS NOTES
"Please see \"Imaging\" tab under \"Chart Review\" for details of today's US at the [facility].    Elgin Whitley MD  Maternal-Fetal Medicine    "

## 2024-05-09 ENCOUNTER — PRE VISIT (OUTPATIENT)
Dept: UROLOGY | Facility: CLINIC | Age: 29
End: 2024-05-09
Payer: COMMERCIAL

## 2024-05-09 NOTE — TELEPHONE ENCOUNTER
Chart reviewed patient contact not needed prior to appointment all necessary results available and ready for visit.        Wilver Sarmiento MA

## 2024-05-13 ENCOUNTER — VIRTUAL VISIT (OUTPATIENT)
Dept: UROLOGY | Facility: CLINIC | Age: 29
End: 2024-05-13
Payer: COMMERCIAL

## 2024-05-13 DIAGNOSIS — O35.EXX0 PYELECTASIS OF FETUS ON PRENATAL ULTRASOUND: Primary | ICD-10-CM

## 2024-05-13 PROCEDURE — 99442 PR PHYSICIAN TELEPHONE EVALUATION 11-20 MIN: CPT

## 2024-05-13 NOTE — LETTER
2024      RE: Isis Hurd  [address] [facility] [address]     Dear Colleague,    Thank you for the opportunity to participate in the care of your patient, Isis Hurd, at the [facility] at [facility]. Please see a copy of my visit note below.    [facility]  [address] [place] [address]    RE:  Isis Hurd  :  1995  [place] MRN:  [id number]  Date of visit:  May 12, 2024    Dear ONEL Jarrett and Colleagues:    I had the pleasure of seeing your patient, Isis, today through the [facility] in urology consultation  via telephone visit  for the question of prenatally detected fetal pyelectasis.  Please see below the details of this visit and my impression and plans discussed with the family.    CC:  fetal pyelectasis     History of Present Illness      Isis is a 29 year old woman whom I was asked to see in consultation for the above. The sex of the fetus is male. Fetal pyelectasis noted around the 21 week US at that time Rt renal APD- 4.7mm, Left renal APD 6.4mm. So far the pregnancy has been going well. Isis is planning to deliver at [place], she is 33 week today with a planned due date of 24. There is no family history of genitourinary disorders in childhood. She has a 19month old son at home.     Physical Exam   Unable to obtain completed via telephone call.  Results     I personally reviewed all the radiographic imaging and interpreted the results as documented.     Prenatal US ( focused) 32 week   Amniotic Fluid: Normal  Bladder: normal  Date: 24    Urinary Tract Biometry:  Rt Renal pelvis ap 2.8mm  Lt Renal pelvis ap 8.1mm, There dilation of the renal pelvis without dilation of the calyces. Parenchyma is of normal echogenicity and thickness.     Impressions     Fetal pyelectasis, unilateral-left  kidney, UTD A1, low risk    Plan     Renal Ultrasound and Clinic visit at 2-4 weeks     We discussed the likely prenatal causes for this, including prenatal obstructive issues that have already resolved versus those that may need surgical help with resolution in childhood, as well as the possibility of vesicoureteral reflux. Phone number to our nurse given to patient so she can call us when the baby is born to arrange follow up, information also available on AVS (After Visit Summary). Our long term plan for the baby will be established after birth, but no need for immediate prophylactic antibiotics nor testing beyond the renal ultrasound at 2-4 weeks.    I addressed all questions and encouraged a phone call from their MFM if there are any future concerns during the pregnancy.Thank you very much for allowing me the opportunity to participate in this nice family's care with you.    Type of service: Telephone visit  Phone call duration: Start time: 9:30 Stop Time:9:45  Total Time: 15 minutes  Originating Location (pt. Location): Home  Distant Location (provider location):  [facility]   Mode of Communication:  telephone    23 minutes spent on the date of the encounter doing chart review, history and exam, documentation, education and further activities per the note.    Sincerely,  Sara King DNP, APRN, CPNP  Pediatric Urology  [facility]

## 2024-05-13 NOTE — PATIENT INSTRUCTIONS
[facility]   Department of Pediatric Urology    Dr. Jace Moran, Pediatric Urologist  Dr. Rivero, Pediatric Urologist  MARY Toney DNP CPNP Lindsay Barriere, DNP CFNP    [facility]  Nurse Coordinator:  [phone]    [facility]  Nurse Coordinator: [phone]    [place]  Nurse Coordinator: [phone]    Once your baby is born, please do the following:  -After you have gone home, please call the Nurse Coordinator at your preferred  location and give her your baby s name and date of birth. She will  help coordinate the tests that need to be done about 2-4 weeks  after birth.    Your baby s test may include:  -Renal Bladder Ultrasound  (all locations)

## 2024-05-13 NOTE — PROGRESS NOTES
[facility] group  [address] [place] [id number]    RE:  Isis Hurd  :  1995  [place] MRN:  [id number]  Date of visit:  May 12, 2024    Dear ONEL Jarrett and Colleagues:    I had the pleasure of seeing your patient, Isis, today through the [facility] in urology consultation  via telephone visit  for the question of prenatally detected fetal pyelectasis.  Please see below the details of this visit and my impression and plans discussed with the family.    CC:  fetal pyelectasis     History of Present Illness      Isis is a 29 year old woman whom I was asked to see in consultation for the above. The sex of the fetus is male. Fetal pyelectasis noted around the 21 week US at that time Rt renal APD- 4.7mm, Left renal APD 6.4mm. So far the pregnancy has been going well. Isis is planning to deliver at [place], she is 33 week today with a planned due date of 24. There is no family history of genitourinary disorders in childhood. She has a 19month old son at home.     Physical Exam   Unable to obtain completed via telephone call.  Results     I personally reviewed all the radiographic imaging and interpreted the results as documented.     Prenatal US ( focused) 32 week   Amniotic Fluid: Normal  Bladder: normal  Date: 24    Urinary Tract Biometry:  Rt Renal pelvis ap 2.8mm  Lt Renal pelvis ap 8.1mm, There dilation of the renal pelvis without dilation of the calyces. Parenchyma is of normal echogenicity and thickness.     Impressions     Fetal pyelectasis, unilateral-left kidney, UTD A1, low risk    Plan     Renal Ultrasound and Clinic visit at 2-4 weeks     We discussed the likely prenatal causes for this, including prenatal obstructive issues that have already resolved versus those that may need surgical help with resolution in childhood, as well as the possibility of vesicoureteral reflux. Phone number  to our nurse given to patient so she can call us when the baby is born to arrange follow up, information also available on AVS (After Visit Summary). Our long term plan for the baby will be established after birth, but no need for immediate prophylactic antibiotics nor testing beyond the renal ultrasound at 2-4 weeks.    I addressed all questions and encouraged a phone call from their MFM if there are any future concerns during the pregnancy.Thank you very much for allowing me the opportunity to participate in this nice family's care with you.    Type of service: Telephone visit  Phone call duration: Start time: 9:30 Stop Time:9:45  Total Time: 15 minutes  Originating Location (pt. Location): Home  Distant Location (provider location):  [facility]   Mode of Communication:  telephone    23 minutes spent on the date of the encounter doing chart review, history and exam, documentation, education and further activities per the note.    Sincerely,  Sara King DNP, APRN, CPNP  Pediatric Urology  [facility]

## 2024-07-13 ENCOUNTER — HEALTH MAINTENANCE LETTER (OUTPATIENT)
Age: 29
End: 2024-07-13

## 2025-07-19 ENCOUNTER — HEALTH MAINTENANCE LETTER (OUTPATIENT)
Age: 30
End: 2025-07-19